# Patient Record
Sex: MALE | Race: WHITE | NOT HISPANIC OR LATINO | Employment: FULL TIME | URBAN - METROPOLITAN AREA
[De-identification: names, ages, dates, MRNs, and addresses within clinical notes are randomized per-mention and may not be internally consistent; named-entity substitution may affect disease eponyms.]

---

## 2019-12-12 ENCOUNTER — OFFICE VISIT (OUTPATIENT)
Dept: URGENT CARE | Facility: CLINIC | Age: 49
End: 2019-12-12
Payer: COMMERCIAL

## 2019-12-12 VITALS
HEIGHT: 73 IN | HEART RATE: 76 BPM | SYSTOLIC BLOOD PRESSURE: 164 MMHG | OXYGEN SATURATION: 100 % | DIASTOLIC BLOOD PRESSURE: 101 MMHG | TEMPERATURE: 99 F | BODY MASS INDEX: 27.83 KG/M2 | WEIGHT: 210 LBS

## 2019-12-12 DIAGNOSIS — L03.90 CELLULITIS, UNSPECIFIED CELLULITIS SITE: ICD-10-CM
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
## 2019-12-12 PROCEDURE — 90471 TD VACCINE GREATER THAN OR EQUAL TO 7YO WITH PRESERVATIVE IM: ICD-10-PCS | Mod: S$GLB,,, | Performed by: FAMILY MEDICINE

## 2019-12-12 PROCEDURE — 99203 OFFICE O/P NEW LOW 30 MIN: CPT | Mod: 25,S$GLB,, | Performed by: NURSE PRACTITIONER

## 2019-12-12 PROCEDURE — 99203 PR OFFICE/OUTPT VISIT, NEW, LEVL III, 30-44 MIN: ICD-10-PCS | Mod: 25,S$GLB,, | Performed by: NURSE PRACTITIONER

## 2019-12-12 PROCEDURE — 90471 IMMUNIZATION ADMIN: CPT | Mod: S$GLB,,, | Performed by: FAMILY MEDICINE

## 2019-12-12 PROCEDURE — 90714 TD VACC NO PRESV 7 YRS+ IM: CPT | Mod: S$GLB,,, | Performed by: FAMILY MEDICINE

## 2019-12-12 PROCEDURE — 90714 TD VACCINE GREATER THAN OR EQUAL TO 7YO WITH PRESERVATIVE IM: ICD-10-PCS | Mod: S$GLB,,, | Performed by: FAMILY MEDICINE

## 2019-12-12 RX ORDER — CLINDAMYCIN PHOSPHATE 150 MG/ML
600 INJECTION, SOLUTION INTRAVENOUS
Status: COMPLETED | OUTPATIENT
Start: 2019-12-12 | End: 2019-12-12

## 2019-12-12 RX ORDER — CLINDAMYCIN PHOSPHATE 150 MG/ML
600 INJECTION, SOLUTION INTRAVENOUS
Status: DISCONTINUED | OUTPATIENT
Start: 2019-12-12 | End: 2019-12-12

## 2019-12-12 RX ORDER — NAPROXEN SODIUM 220 MG/1
TABLET, FILM COATED ORAL
COMMUNITY
End: 2022-12-19

## 2019-12-12 RX ORDER — AMOXICILLIN AND CLAVULANATE POTASSIUM 875; 125 MG/1; MG/1
1 TABLET, FILM COATED ORAL 2 TIMES DAILY
Qty: 20 TABLET | Refills: 0 | Status: SHIPPED | OUTPATIENT
Start: 2019-12-12 | End: 2019-12-22

## 2019-12-12 RX ADMIN — CLINDAMYCIN PHOSPHATE 600 MG: 150 INJECTION, SOLUTION INTRAVENOUS at 10:12

## 2019-12-12 NOTE — PROGRESS NOTES
"Subjective:       Patient ID: Dimitri Lott is a 49 y.o. male.    Vitals:  height is 6' 1" (1.854 m) and weight is 95.3 kg (210 lb). His oral temperature is 98.5 °F (36.9 °C). His blood pressure is 164/101 (abnormal) and his pulse is 76. His oxygen saturation is 100%.     Chief Complaint: Animal Bite (right hand)    Mid-day yesterday his cat bit him on the right hand.  Cleaned it with warm soap/water and alcohol.  Treated with ice overnight.    Cat is up to date on immunizations.   He states his tetanus is possible utd but he is unsure     Animal Bite    The incident occurred yesterday. The incident occurred at home. There is an injury to the right hand. The pain is moderate. It is unlikely that a foreign body is present. Pertinent negatives include no cough. His tetanus status is unknown (pt stated he thinks it was a few years ago at [facility]).       Constitution: Negative for chills and fever.   HENT: Negative for facial swelling and sore throat.    Neck: Negative for painful lymph nodes.   Eyes: Negative for eye itching and eyelid swelling.   Respiratory: Negative for cough.    Musculoskeletal: Negative for joint pain and joint swelling.   Skin: Positive for color change, wound, puncture wound and erythema. Negative for pale, rash, abrasion, laceration, lesion, skin thickening/induration, abscess, avulsion and hives.   Allergic/Immunologic: Negative for environmental allergies, immunocompromised state and hives.   Hematologic/Lymphatic: Negative for swollen lymph nodes.       Objective:      Physical Exam   Constitutional: He is oriented to person, place, and time.   Pulmonary/Chest: Effort normal. No respiratory distress.   Musculoskeletal:        Right wrist: He exhibits decreased range of motion, tenderness and swelling. He exhibits no effusion, no crepitus and no deformity.        Arms:  Neurological: He is alert and oriented to person, place, and time.   Skin: Skin is warm. Lesions:  erythema      "       Assessment:       1. Cat bite, initial encounter    2. Cellulitis, unspecified cellulitis site        Plan:         Cat bite, initial encounter    Cellulitis, unspecified cellulitis site    Other orders  -     Cancel: (In Office Administered) Td Vaccine - Preservative Free  -     Discontinue: clindamycin injection 600 mg  -     amoxicillin-clavulanate 875-125mg (AUGMENTIN) 875-125 mg per tablet; Take 1 tablet by mouth 2 (two) times daily. for 10 days  Dispense: 20 tablet; Refill: 0  -     (In Office Administered) Td Vaccine  -     clindamycin injection 600 mg     update the tetanus shot      ER precautions if the redness or swelling starts to streak up the or increase worsening with fever been go to the ER  Patient Instructions    Please see her primary care provider concerning  your high blood pressure    Cat Bite    A cat bite can cause a wound deep enough to break the skin. In such cases, the wound is cleaned and then sometimes closed. If the wound is closed it is usually not closed completely. This is so that fluid can drain if the wound becomes infected. Often the wound is left open to heal. In addition to wound care, a tetanus shot may be given, if needed.  Home care  · Wash your hands well with soap and warm water before and after caring for the wound. This helps lower the risk of infection.  · Care for the wound as directed. If a dressing was applied to the wound, be sure to change it as directed.  · If the wound bleeds, place a clean, soft cloth on the wound. Then firmly apply pressure until the bleeding stops. This may take up to 5 minutes. Do not release the pressure and look at the wound during this time.  · Always get medical attention for cat bites on the hand. They are highly likely to become infected.  · Most wounds heal within 10 days. But an infection can occur even with proper treatment. So be sure to check the wound daily for signs of infection (see below).  · Antibiotics may be prescribed.  These help prevent or treat infection. If youre given antibiotics, take them as directed. Also be sure to complete the medicines.  Rabies prevention  Rabies is a virus that can be carried in certain animals. These can include domestic animals such as cats and dogs. Pets fully vaccinated against rabies (2 shots) are at very low risk of infection. But because human rabies is almost always fatal, any biting pet should be confined for 10 days as an extra precaution. In general, if there is a risk for rabies, the following steps may need to be taken:  · If someones pet cat has bitten you, it should be kept in a secure area for the next 10 days to watch for signs of illness. (If the pet owner wont allow this, contact your local animal control center.) If the cat becomes ill or dies during that time, contact your local animal control center at once so the animal may be tested for rabies. If the cat stays healthy for the next 10 days, there is no danger of rabies in the animal or you.  · If a stray cat bit you, contact your local animal control center. They can give information on capture, quarantine, and animal rabies testing.  · If you cant find the animal that bit you in the next 2 days, and if rabies exists in your area, you may need to receive the rabies vaccine series. Call your healthcare provider right away. Or return to the emergency department promptly.  · All animal bites should be reported to the local animal control center. If you were not given a form to fill out, you can report this yourself.  Follow-up care  Follow up with your healthcare provider, or as directed.  When to seek medical advice  Call your healthcare provider right away if any of these occur:  · Signs of infection:  ¨ Spreading redness or warmth from the wound  ¨ Increased pain or swelling  ¨ Fever of 100.4ºF (38ºC) or higher, or as directed by your healthcare provider  ¨ Colored fluid or pus draining from the wound  ¨ Enlarged lymph nodes  above the area that was bitten, such as lymph nodes in the armpit if you were bitten on the hand or arm. This may be a sign of cat-scratch disease (cat-scratch fever).  · Signs of rabies infection:  ¨ Headache  ¨ Confusion  ¨ Strange behavior  ¨ Increased salivating or drooling  ¨ Seizure  · Decreased ability to move any body part near the bite area  · Bleeding that can't be stopped after 5 minutes of firm pressure  Date Last Reviewed: 3/23/2015  © 0700-5490 [employer]. [address] [place], [place] [id number]. All rights reserved. This information is not intended as a substitute for professional medical care. Always follow your healthcare professional's instructions.

## 2019-12-12 NOTE — PATIENT INSTRUCTIONS
Please see her primary care provider concerning  your high blood pressure    Cat Bite    A cat bite can cause a wound deep enough to break the skin. In such cases, the wound is cleaned and then sometimes closed. If the wound is closed it is usually not closed completely. This is so that fluid can drain if the wound becomes infected. Often the wound is left open to heal. In addition to wound care, a tetanus shot may be given, if needed.  Home care  · Wash your hands well with soap and warm water before and after caring for the wound. This helps lower the risk of infection.  · Care for the wound as directed. If a dressing was applied to the wound, be sure to change it as directed.  · If the wound bleeds, place a clean, soft cloth on the wound. Then firmly apply pressure until the bleeding stops. This may take up to 5 minutes. Do not release the pressure and look at the wound during this time.  · Always get medical attention for cat bites on the hand. They are highly likely to become infected.  · Most wounds heal within 10 days. But an infection can occur even with proper treatment. So be sure to check the wound daily for signs of infection (see below).  · Antibiotics may be prescribed. These help prevent or treat infection. If youre given antibiotics, take them as directed. Also be sure to complete the medicines.  Rabies prevention  Rabies is a virus that can be carried in certain animals. These can include domestic animals such as cats and dogs. Pets fully vaccinated against rabies (2 shots) are at very low risk of infection. But because human rabies is almost always fatal, any biting pet should be confined for 10 days as an extra precaution. In general, if there is a risk for rabies, the following steps may need to be taken:  · If someones pet cat has bitten you, it should be kept in a secure area for the next 10 days to watch for signs of illness. (If the pet owner wont allow this, contact your local animal  control center.) If the cat becomes ill or dies during that time, contact your local animal control center at once so the animal may be tested for rabies. If the cat stays healthy for the next 10 days, there is no danger of rabies in the animal or you.  · If a stray cat bit you, contact your local animal control center. They can give information on capture, quarantine, and animal rabies testing.  · If you cant find the animal that bit you in the next 2 days, and if rabies exists in your area, you may need to receive the rabies vaccine series. Call your healthcare provider right away. Or return to the emergency department promptly.  · All animal bites should be reported to the local animal control center. If you were not given a form to fill out, you can report this yourself.  Follow-up care  Follow up with your healthcare provider, or as directed.  When to seek medical advice  Call your healthcare provider right away if any of these occur:  · Signs of infection:  ¨ Spreading redness or warmth from the wound  ¨ Increased pain or swelling  ¨ Fever of 100.4ºF (38ºC) or higher, or as directed by your healthcare provider  ¨ Colored fluid or pus draining from the wound  ¨ Enlarged lymph nodes above the area that was bitten, such as lymph nodes in the armpit if you were bitten on the hand or arm. This may be a sign of cat-scratch disease (cat-scratch fever).  · Signs of rabies infection:  ¨ Headache  ¨ Confusion  ¨ Strange behavior  ¨ Increased salivating or drooling  ¨ Seizure  · Decreased ability to move any body part near the bite area  · Bleeding that can't be stopped after 5 minutes of firm pressure  Date Last Reviewed: 3/23/2015  © 4277-4151 [employer]. [address] [place] [address], [place] [address]. All rights reserved. This information is not intended as a substitute for professional medical care. Always follow your healthcare professional's instructions.

## 2019-12-23 DIAGNOSIS — E78.5 HYPERLIPIDEMIA, UNSPECIFIED HYPERLIPIDEMIA TYPE: Primary | ICD-10-CM

## 2019-12-23 RX ORDER — ATORVASTATIN CALCIUM 40 MG/1
40 TABLET, FILM COATED ORAL DAILY
Qty: 90 TABLET | Refills: 1 | Status: SHIPPED | OUTPATIENT
Start: 2019-12-23 | End: 2020-01-13 | Stop reason: SDUPTHER

## 2019-12-23 NOTE — TELEPHONE ENCOUNTER
----- Message from Ifrah Romero sent at 12/23/2019 11:21 AM CST -----  Refill for Atorvastatin 40 mg. Qty 90. [facility] [address] in [place]. Pt #[phone]

## 2019-12-31 ENCOUNTER — TELEPHONE (OUTPATIENT)
Dept: FAMILY MEDICINE | Facility: CLINIC | Age: 49
End: 2019-12-31

## 2019-12-31 DIAGNOSIS — E78.00 PURE HYPERCHOLESTEROLEMIA: ICD-10-CM

## 2019-12-31 DIAGNOSIS — Z79.899 ENCOUNTER FOR LONG-TERM (CURRENT) USE OF OTHER MEDICATIONS: Primary | ICD-10-CM

## 2020-01-12 PROBLEM — E78.00 PURE HYPERCHOLESTEROLEMIA: Status: ACTIVE | Noted: 2019-01-10

## 2020-01-13 ENCOUNTER — OFFICE VISIT (OUTPATIENT)
Dept: FAMILY MEDICINE | Facility: CLINIC | Age: 50
End: 2020-01-13
Payer: COMMERCIAL

## 2020-01-13 VITALS
WEIGHT: 212 LBS | SYSTOLIC BLOOD PRESSURE: 132 MMHG | DIASTOLIC BLOOD PRESSURE: 86 MMHG | HEART RATE: 64 BPM | BODY MASS INDEX: 27.97 KG/M2

## 2020-01-13 DIAGNOSIS — S61.451S CAT BITE OF RIGHT HAND, SEQUELA: ICD-10-CM
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
## 2020-01-13 DIAGNOSIS — E78.5 HYPERLIPIDEMIA, UNSPECIFIED HYPERLIPIDEMIA TYPE: Primary | ICD-10-CM

## 2020-01-13 PROCEDURE — 99214 OFFICE O/P EST MOD 30 MIN: CPT | Mod: S$GLB,,, | Performed by: FAMILY MEDICINE

## 2020-01-13 PROCEDURE — 99214 PR OFFICE/OUTPT VISIT, EST, LEVL IV, 30-39 MIN: ICD-10-PCS | Mod: S$GLB,,, | Performed by: FAMILY MEDICINE

## 2020-01-13 PROCEDURE — 3008F BODY MASS INDEX DOCD: CPT | Mod: S$GLB,,, | Performed by: FAMILY MEDICINE

## 2020-01-13 PROCEDURE — 3008F PR BODY MASS INDEX (BMI) DOCUMENTED: ICD-10-PCS | Mod: S$GLB,,, | Performed by: FAMILY MEDICINE

## 2020-01-13 RX ORDER — HALCINONIDE 1 MG/G
CREAM TOPICAL
Refills: 0 | COMMUNITY
Start: 2019-11-19 | End: 2021-01-04

## 2020-01-13 RX ORDER — ATORVASTATIN CALCIUM 40 MG/1
40 TABLET, FILM COATED ORAL DAILY
Qty: 90 TABLET | Refills: 1 | Status: SHIPPED | OUTPATIENT
Start: 2020-01-13 | End: 2020-08-13 | Stop reason: SDUPTHER

## 2020-01-13 NOTE — PROGRESS NOTES
SUBJECTIVE:    Patient ID: Dimitri Lott is a 49 y.o. male.    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  His wife goes with him they tried exercise during the week he walks 1-2 miles twice a week.  He does have a history of a recent PET CAT bite on his right hand 1 month ago which is still somewhat sore.  He does not smoke and he rarely drinks any alcohol.    2019 he had a colonoscopy and is to return in 5-10 years.      No visits with results within 6 Month(s) from this visit.   Latest known visit with results is:   No results found for any previous visit.       Past Medical History:   Diagnosis Date    Hyperlipidemia      Past Surgical History:   Procedure Laterality Date    COLONOSCOPY  2019    Dr. -RTC 5-10 years     No family history on file.    Marital Status: Single  Alcohol History:  has no alcohol history on file.  Tobacco History:  reports that he has never smoked. He has never used smokeless tobacco.  Drug History:  has no drug history on file.    Review of patient's allergies indicates:  No Known Allergies    Current Outpatient Medications:     aspirin 81 MG Chew, 1 tablet, Disp: , Rfl:     atorvastatin (LIPITOR) 40 MG tablet, Take 1 tablet (40 mg total) by mouth once daily., Disp: 90 tablet, Rfl: 1    halcinonide (HALOG) 0.1 % Crea, APPLY TO AFECTED AREA OF ARMS TWICE DAILY FOR UP TO 2 WEEKS, Disp: , Rfl: 0    Review of Systems   Constitutional: Negative for appetite change, chills, fatigue, fever and unexpected weight change.   HENT: Negative for congestion (Flonase  prn/Zyrtec), ear pain and trouble swallowing.    Eyes: Negative for pain, discharge and visual disturbance.   Respiratory: Negative for apnea, cough, shortness of breath and wheezing.    Cardiovascular: Negative for chest pain and leg swelling.   Gastrointestinal:  Negative for abdominal pain, blood in stool, constipation, diarrhea, nausea and vomiting.   Endocrine: Negative for cold intolerance, heat intolerance and polydipsia.   Genitourinary: Negative for dysuria, hematuria, testicular pain and urgency.   Musculoskeletal: Negative for gait problem, joint swelling and myalgias.   Neurological: Negative for dizziness, seizures and numbness.   Psychiatric/Behavioral: Negative for agitation, behavioral problems, hallucinations and sleep disturbance. The patient is not nervous/anxious.           Objective:      Vitals:    01/13/20 0831   BP: 132/86   Pulse: 64   Weight: 96.2 kg (212 lb)     Body mass index is 27.97 kg/m².  Physical Exam   Constitutional: He is oriented to person, place, and time. He appears well-developed and well-nourished.   HENT:   Head: Normocephalic and atraumatic.   Right Ear: External ear normal.   Left Ear: External ear normal.   Nose: Nose normal.   Mouth/Throat: Oropharynx is clear and moist.   Eyes: Pupils are equal, round, and reactive to light. EOM are normal.   Neck: Normal range of motion. Neck supple. Carotid bruit is not present. No thyromegaly present.   Cardiovascular: Normal rate, regular rhythm, normal heart sounds and intact distal pulses.   No murmur heard.  Pulmonary/Chest: Effort normal and breath sounds normal. He has no wheezes. He has no rales.   Abdominal: Soft. Bowel sounds are normal. He exhibits no distension. There is no hepatosplenomegaly. There is no tenderness.   Musculoskeletal: Normal range of motion. He exhibits no tenderness or deformity.        Lumbar back: Normal. He exhibits no pain and no spasm.   Bends 90 degrees at  waist, shoulders and knees have full range of motion, he walks with no limp.   Lymphadenopathy:     He has no cervical adenopathy.   Neurological: He is alert and oriented to person, place, and time. No cranial nerve deficit. Coordination normal.   Skin: Skin is warm and dry. No rash noted.   Puncture  scars to the right index finger MCP joint area from a cat bite recently.  No erythema or swelling noted   Psychiatric: He has a normal mood and affect. His behavior is normal. Judgment and thought content normal.   Nursing note and vitals reviewed.        Assessment:       1. Hyperlipidemia, unspecified hyperlipidemia type    2. Cat bite of right hand, sequela         Plan:       Hyperlipidemia, unspecified hyperlipidemia type  -     atorvastatin (LIPITOR) 40 MG tablet; Take 1 tablet (40 mg total) by mouth once daily.  Dispense: 90 tablet; Refill: 1  -     Comprehensive metabolic panel; Future; Expected date: 01/13/2020  -     Lipid panel; Future; Expected date: 01/13/2020  Will draw lipids today.  Continue atorvastatin, recheck lipids in 6 months as well  Cat bite of right hand, sequela  No obvious infection seen in the right hand but if pain worsens we will investigate with further imaging x-rays    No follow-ups on file.

## 2020-01-14 LAB
ALBUMIN SERPL-MCNC: 4.8 G/DL (ref 3.6–5.1)
ALBUMIN/GLOB SERPL: 1.9 (CALC) (ref 1–2.5)
ALP SERPL-CCNC: 88 U/L (ref 40–115)
ALT SERPL-CCNC: 47 U/L (ref 9–46)
AST SERPL-CCNC: 24 U/L (ref 10–35)
BILIRUB SERPL-MCNC: 0.6 MG/DL (ref 0.2–1.2)
BUN SERPL-MCNC: 13 MG/DL (ref 7–25)
BUN/CREAT SERPL: ABNORMAL (CALC) (ref 6–22)
CALCIUM SERPL-MCNC: 9.7 MG/DL (ref 8.6–10.3)
CHLORIDE SERPL-SCNC: 104 MMOL/L (ref 98–110)
CHOLEST SERPL-MCNC: 141 MG/DL
CHOLEST/HDLC SERPL: 3.9 (CALC)
CO2 SERPL-SCNC: 28 MMOL/L (ref 20–32)
CREAT SERPL-MCNC: 1.13 MG/DL (ref 0.7–1.33)
GFRSERPLBLD MDRD-ARVRAT: 75 ML/MIN/1.73M2
GLOBULIN SER CALC-MCNC: 2.5 G/DL (CALC) (ref 1.9–3.7)
GLUCOSE SERPL-MCNC: 90 MG/DL (ref 65–99)
HDLC SERPL-MCNC: 36 MG/DL
LDLC SERPL CALC-MCNC: 71 MG/DL (CALC)
NONHDLC SERPL-MCNC: 105 MG/DL (CALC)
POTASSIUM SERPL-SCNC: 4.6 MMOL/L (ref 3.5–5.3)
PROT SERPL-MCNC: 7.3 G/DL (ref 6.1–8.1)
SODIUM SERPL-SCNC: 141 MMOL/L (ref 135–146)
TRIGL SERPL-MCNC: 243 MG/DL

## 2020-01-20 ENCOUNTER — TELEPHONE (OUTPATIENT)
Dept: FAMILY MEDICINE | Facility: CLINIC | Age: 50
End: 2020-01-20

## 2020-01-20 NOTE — TELEPHONE ENCOUNTER
Spoke to patient with results verbatim per Dr Sanchez. Verbalized understanding. Remind me for lab.

## 2020-01-20 NOTE — TELEPHONE ENCOUNTER
----- Message from Dionicio Sanchez MD sent at 1/18/2020  8:44 AM CST -----  Call patient.  Cholesterol low at 141.  Triglycerides still slightly high at 243.  Continue atorvastatin 40 mg daily.  Liver and kidneys look fine.  Recheck cbc  psa CMP and lipids in 6 months.

## 2020-07-07 ENCOUNTER — TELEPHONE (OUTPATIENT)
Dept: FAMILY MEDICINE | Facility: CLINIC | Age: 50
End: 2020-07-07

## 2020-07-07 DIAGNOSIS — Z79.899 ENCOUNTER FOR LONG-TERM (CURRENT) USE OF OTHER MEDICATIONS: ICD-10-CM

## 2020-07-07 DIAGNOSIS — E78.5 HYPERLIPIDEMIA, UNSPECIFIED HYPERLIPIDEMIA TYPE: Primary | ICD-10-CM

## 2020-07-07 DIAGNOSIS — Z12.5 SCREENING FOR PROSTATE CANCER: ICD-10-CM

## 2020-07-07 NOTE — TELEPHONE ENCOUNTER
LMOR to call Sonia CHURCH so that I can let him know fasting lab is due. Also sent portal message. Updated remind me. Orders pended to [facility].

## 2020-07-07 NOTE — TELEPHONE ENCOUNTER
----- Message from [facility], RT sent at 1/20/2020 11:52 AM CST -----  Regarding: Lab due  Notes recorded by Dionicio Sanchez MD on 1/18/2020 at 8:44 AM CST  Call patient.  Cholesterol low at 141.  Triglycerides still slightly high at 243.  Continue atorvastatin 40 mg daily.  Liver and kidneys look fine.  Recheck cbc  psa CMP and lipids in 6 months.

## 2020-07-23 ENCOUNTER — TELEPHONE (OUTPATIENT)
Dept: FAMILY MEDICINE | Facility: CLINIC | Age: 50
End: 2020-07-23

## 2020-07-23 NOTE — TELEPHONE ENCOUNTER
----- Message from [facility], RT sent at 1/20/2020 11:52 AM CST -----  Regarding: Lab due  Notes recorded by Dionicio Sanchez MD on 1/18/2020 at 8:44 AM CST  Call patient.  Cholesterol low at 141.  Triglycerides still slightly high at 243.  Continue atorvastatin 40 mg daily.  Liver and kidneys look fine.  Recheck cbc  psa CMP and lipids in 6 months.

## 2020-07-23 NOTE — TELEPHONE ENCOUNTER
LMOR that I had sent portal message regarding the reason I was calling and to call with any questions. No comm consent.

## 2020-08-06 ENCOUNTER — TELEPHONE (OUTPATIENT)
Dept: FAMILY MEDICINE | Facility: CLINIC | Age: 50
End: 2020-08-06

## 2020-08-06 NOTE — TELEPHONE ENCOUNTER
Sent another portal message today regarding this. Patient replied on 7/23 that he knows lab is due. This is 3rd/4th attempt. Can I aldair reminder complete?

## 2020-08-06 NOTE — TELEPHONE ENCOUNTER
----- Message from [facility], RT sent at 1/20/2020 11:52 AM CST -----  Regarding: Lab due  Notes recorded by Dionicio Sanchez MD on 1/18/2020 at 8:44 AM CST  Call patient.  Cholesterol low at 141.  Triglycerides still slightly high at 243.  Continue atorvastatin 40 mg daily.  Liver and kidneys look fine.  Recheck cbc  psa CMP and lipids in 6 months.

## 2020-08-12 LAB
ALBUMIN SERPL-MCNC: 4.9 G/DL (ref 3.6–5.1)
ALBUMIN/GLOB SERPL: 1.9 (CALC) (ref 1–2.5)
ALP SERPL-CCNC: 81 U/L (ref 35–144)
ALT SERPL-CCNC: 45 U/L (ref 9–46)
AST SERPL-CCNC: 28 U/L (ref 10–35)
BASOPHILS # BLD AUTO: 73 CELLS/UL (ref 0–200)
BASOPHILS NFR BLD AUTO: 1.1 %
BILIRUB SERPL-MCNC: 0.7 MG/DL (ref 0.2–1.2)
BUN SERPL-MCNC: 14 MG/DL (ref 7–25)
BUN/CREAT SERPL: NORMAL (CALC) (ref 6–22)
CALCIUM SERPL-MCNC: 9.6 MG/DL (ref 8.6–10.3)
CHLORIDE SERPL-SCNC: 103 MMOL/L (ref 98–110)
CHOLEST SERPL-MCNC: 180 MG/DL
CHOLEST/HDLC SERPL: 5 (CALC)
CO2 SERPL-SCNC: 29 MMOL/L (ref 20–32)
CREAT SERPL-MCNC: 1.11 MG/DL (ref 0.7–1.33)
EOSINOPHIL # BLD AUTO: 462 CELLS/UL (ref 15–500)
EOSINOPHIL NFR BLD AUTO: 7 %
ERYTHROCYTE [DISTWIDTH] IN BLOOD BY AUTOMATED COUNT: 12.7 % (ref 11–15)
GFRSERPLBLD MDRD-ARVRAT: 76 ML/MIN/1.73M2
GLOBULIN SER CALC-MCNC: 2.6 G/DL (CALC) (ref 1.9–3.7)
GLUCOSE SERPL-MCNC: 96 MG/DL (ref 65–99)
HCT VFR BLD AUTO: 45.5 % (ref 38.5–50)
HDLC SERPL-MCNC: 36 MG/DL
HGB BLD-MCNC: 15 G/DL (ref 13.2–17.1)
LDLC SERPL CALC-MCNC: 96 MG/DL (CALC)
LYMPHOCYTES # BLD AUTO: 2198 CELLS/UL (ref 850–3900)
LYMPHOCYTES NFR BLD AUTO: 33.3 %
MCH RBC QN AUTO: 29.6 PG (ref 27–33)
MCHC RBC AUTO-ENTMCNC: 33 G/DL (ref 32–36)
MCV RBC AUTO: 89.9 FL (ref 80–100)
MONOCYTES # BLD AUTO: 521 CELLS/UL (ref 200–950)
MONOCYTES NFR BLD AUTO: 7.9 %
NEUTROPHILS # BLD AUTO: 3346 CELLS/UL (ref 1500–7800)
NEUTROPHILS NFR BLD AUTO: 50.7 %
NONHDLC SERPL-MCNC: 144 MG/DL (CALC)
PLATELET # BLD AUTO: 285 THOUSAND/UL (ref 140–400)
PMV BLD REES-ECKER: 9.7 FL (ref 7.5–12.5)
POTASSIUM SERPL-SCNC: 4.5 MMOL/L (ref 3.5–5.3)
PROT SERPL-MCNC: 7.5 G/DL (ref 6.1–8.1)
PSA SERPL-MCNC: 0.3 NG/ML
RBC # BLD AUTO: 5.06 MILLION/UL (ref 4.2–5.8)
SODIUM SERPL-SCNC: 141 MMOL/L (ref 135–146)
TRIGL SERPL-MCNC: 358 MG/DL
WBC # BLD AUTO: 6.6 THOUSAND/UL (ref 3.8–10.8)

## 2020-08-13 ENCOUNTER — PATIENT MESSAGE (OUTPATIENT)
Dept: FAMILY MEDICINE | Facility: CLINIC | Age: 50
End: 2020-08-13

## 2020-08-13 ENCOUNTER — OFFICE VISIT (OUTPATIENT)
Dept: FAMILY MEDICINE | Facility: CLINIC | Age: 50
End: 2020-08-13
Payer: COMMERCIAL

## 2020-08-13 DIAGNOSIS — E78.2 MIXED HYPERLIPIDEMIA: ICD-10-CM

## 2020-08-13 PROCEDURE — 99212 PR OFFICE/OUTPT VISIT, EST, LEVL II, 10-19 MIN: ICD-10-PCS | Mod: 95,,, | Performed by: NURSE PRACTITIONER

## 2020-08-13 PROCEDURE — 99212 OFFICE O/P EST SF 10 MIN: CPT | Mod: 95,,, | Performed by: NURSE PRACTITIONER

## 2020-08-13 RX ORDER — ATORVASTATIN CALCIUM 40 MG/1
40 TABLET, FILM COATED ORAL DAILY
Qty: 90 TABLET | Refills: 1 | Status: SHIPPED | OUTPATIENT
Start: 2020-08-13 | End: 2021-01-04 | Stop reason: SDUPTHER

## 2020-08-13 NOTE — PROGRESS NOTES
Subjective:        The chief complaint leading to consultation is: lab results and med refill  The patient location is:  Home  Visit type: Virtual visit with synchronous audio/video or audio only  This was a phone conversation in lieu of in-person visit due to the coronavirus emergency. Patient acknowledged and agreed to the telephone encounter.     Pt presents via audio visit for med refill and discuss lab results. Triglycerides a little elevated from last set of blood work but overall cholesterol good. Per pt, has history of very high triglycerides and these numbers are much better under control. All other blood work unremarkable. Denies any recent illnesses or complaints.       Past Surgical History:   Procedure Laterality Date    COLONOSCOPY  2019    Dr. -RTC 5-10 years     Past Medical History:   Diagnosis Date    Hyperlipidemia      No family history on file.     Social History:   Marital Status: Single  Alcohol History:  has no history on file for alcohol.  Tobacco History:  reports that he has never smoked. He has never used smokeless tobacco.  Drug History:  has no history on file for drug.    Review of patient's allergies indicates:  No Known Allergies    Current Outpatient Medications   Medication Sig Dispense Refill    aspirin 81 MG Chew 1 tablet      atorvastatin (LIPITOR) 40 MG tablet Take 1 tablet (40 mg total) by mouth once daily. 90 tablet 1    halcinonide (HALOG) 0.1 % Crea APPLY TO AFECTED AREA OF ARMS TWICE DAILY FOR UP TO 2 WEEKS  0     No current facility-administered medications for this visit.        Review of Systems   Constitutional: Negative.    HENT: Negative for congestion, ear pain, sinus pressure, sinus pain, tinnitus and trouble swallowing.    Eyes: Negative for pain and redness.   Respiratory: Negative for cough, chest tightness, shortness of breath and wheezing.    Cardiovascular: Negative for chest pain and palpitations.   Gastrointestinal: Negative for abdominal  pain, nausea and vomiting.   Genitourinary: Negative for dysuria, frequency and urgency.   Musculoskeletal: Negative for arthralgias, back pain and myalgias.   Skin: Negative for rash and wound.   Neurological: Negative for dizziness, weakness, light-headedness and headaches.   Psychiatric/Behavioral: Negative.          Objective:        Physical Exam:   Physical Exam  Pulmonary:      Effort: No respiratory distress.   Neurological:      Mental Status: He is alert and oriented to person, place, and time.   Psychiatric:         Mood and Affect: Mood normal.              Assessment:       1. Mixed hyperlipidemia      Plan:   Mixed hyperlipidemia  -     atorvastatin (LIPITOR) 40 MG tablet; Take 1 tablet (40 mg total) by mouth once daily.  Dispense: 90 tablet; Refill: 1  - Consider adding triglyceride-lowering agent but pt not interested at this time.       Follow up in about 6 months (around 2/13/2021) for Annual Physical, Follow-up with Dr. Sanchez.    Total time spent with patient: 10 minutes    Each patient to whom he or she provides medical services by telemedicine is:  (1) informed of the relationship between the physician and patient and the respective role of any other health care provider with respect to management of the patient; and (2) notified that he or she may decline to receive medical services by telemedicine and may withdraw from such care at any time.    This note was created using [employer] voice recognition software that occasionally misinterprets phrases or words.

## 2020-08-17 ENCOUNTER — TELEPHONE (OUTPATIENT)
Dept: FAMILY MEDICINE | Facility: CLINIC | Age: 50
End: 2020-08-17

## 2020-08-17 NOTE — TELEPHONE ENCOUNTER
----- Message from Dionicio Sanchez MD sent at 8/16/2020  2:04 PM CDT -----  Call patient.  CBC looks normal with no anemia.  Cholesterol normal at 180 but triglycerides are high at 358.  Kidneys and liver are normal.  Prostate normal with a PSA of 0.3.  To reduce triglycerides add Krill oil twice a day.  Continue atorvastatin.  Repeat CMP and lipids in 6 months

## 2020-08-18 RX ORDER — MV/FA/DHA/EPA/FISH OIL/SAW/GNK 400MCG-200
1 COMBINATION PACKAGE (EA) ORAL 2 TIMES DAILY
COMMUNITY

## 2020-08-18 NOTE — TELEPHONE ENCOUNTER
Spoke to patient with results verbatim per Dr Sanchez. Verbalized understanding on all. Will start Krill Oil. Added to med list.  Remind me created for lab.

## 2021-01-04 ENCOUNTER — OFFICE VISIT (OUTPATIENT)
Dept: FAMILY MEDICINE | Facility: CLINIC | Age: 51
End: 2021-01-04
Payer: COMMERCIAL

## 2021-01-04 VITALS
SYSTOLIC BLOOD PRESSURE: 138 MMHG | DIASTOLIC BLOOD PRESSURE: 88 MMHG | HEART RATE: 72 BPM | BODY MASS INDEX: 27.7 KG/M2 | WEIGHT: 209 LBS | HEIGHT: 73 IN

## 2021-01-04 DIAGNOSIS — Z00.00 GENERAL MEDICAL EXAM: ICD-10-CM

## 2021-01-04 DIAGNOSIS — E78.2 MIXED HYPERLIPIDEMIA: ICD-10-CM

## 2021-01-04 PROCEDURE — 3008F BODY MASS INDEX DOCD: CPT | Mod: S$GLB,,, | Performed by: NURSE PRACTITIONER

## 2021-01-04 PROCEDURE — 99396 PR PREVENTIVE VISIT,EST,40-64: ICD-10-PCS | Mod: S$GLB,,, | Performed by: NURSE PRACTITIONER

## 2021-01-04 PROCEDURE — 3008F PR BODY MASS INDEX (BMI) DOCUMENTED: ICD-10-PCS | Mod: S$GLB,,, | Performed by: NURSE PRACTITIONER

## 2021-01-04 PROCEDURE — 99396 PREV VISIT EST AGE 40-64: CPT | Mod: S$GLB,,, | Performed by: NURSE PRACTITIONER

## 2021-01-04 RX ORDER — ATORVASTATIN CALCIUM 40 MG/1
40 TABLET, FILM COATED ORAL DAILY
Qty: 90 TABLET | Refills: 1 | Status: SHIPPED | OUTPATIENT
Start: 2021-01-04 | End: 2021-10-18 | Stop reason: SDUPTHER

## 2021-01-05 LAB
ALBUMIN SERPL-MCNC: 4.8 G/DL (ref 3.6–5.1)
ALBUMIN/GLOB SERPL: 1.8 (CALC) (ref 1–2.5)
ALP SERPL-CCNC: 79 U/L (ref 35–144)
ALT SERPL-CCNC: 71 U/L (ref 9–46)
AST SERPL-CCNC: 42 U/L (ref 10–35)
BILIRUB SERPL-MCNC: 0.8 MG/DL (ref 0.2–1.2)
BUN SERPL-MCNC: 12 MG/DL (ref 7–25)
BUN/CREAT SERPL: ABNORMAL (CALC) (ref 6–22)
CALCIUM SERPL-MCNC: 9.9 MG/DL (ref 8.6–10.3)
CHLORIDE SERPL-SCNC: 104 MMOL/L (ref 98–110)
CHOLEST SERPL-MCNC: 166 MG/DL
CHOLEST/HDLC SERPL: 4 (CALC)
CO2 SERPL-SCNC: 23 MMOL/L (ref 20–32)
CREAT SERPL-MCNC: 0.98 MG/DL (ref 0.7–1.33)
GFRSERPLBLD MDRD-ARVRAT: 88 ML/MIN/1.73M2
GLOBULIN SER CALC-MCNC: 2.6 G/DL (CALC) (ref 1.9–3.7)
GLUCOSE SERPL-MCNC: 88 MG/DL (ref 65–99)
HDLC SERPL-MCNC: 41 MG/DL
LDLC SERPL CALC-MCNC: 92 MG/DL (CALC)
NONHDLC SERPL-MCNC: 125 MG/DL (CALC)
POTASSIUM SERPL-SCNC: 4.7 MMOL/L (ref 3.5–5.3)
PROT SERPL-MCNC: 7.4 G/DL (ref 6.1–8.1)
SODIUM SERPL-SCNC: 137 MMOL/L (ref 135–146)
TRIGL SERPL-MCNC: 235 MG/DL

## 2021-04-29 ENCOUNTER — PATIENT MESSAGE (OUTPATIENT)
Dept: RESEARCH | Facility: HOSPITAL | Age: 51
End: 2021-04-29

## 2021-08-09 ENCOUNTER — PATIENT MESSAGE (OUTPATIENT)
Dept: FAMILY MEDICINE | Facility: CLINIC | Age: 51
End: 2021-08-09

## 2021-10-17 ENCOUNTER — PATIENT MESSAGE (OUTPATIENT)
Dept: FAMILY MEDICINE | Facility: CLINIC | Age: 51
End: 2021-10-17
Payer: COMMERCIAL

## 2021-10-17 DIAGNOSIS — E78.2 MIXED HYPERLIPIDEMIA: ICD-10-CM

## 2021-10-18 RX ORDER — ATORVASTATIN CALCIUM 40 MG/1
40 TABLET, FILM COATED ORAL DAILY
Qty: 90 TABLET | Refills: 1 | Status: SHIPPED | OUTPATIENT
Start: 2021-10-18 | End: 2021-12-06 | Stop reason: SDUPTHER

## 2021-11-29 ENCOUNTER — TELEPHONE (OUTPATIENT)
Dept: FAMILY MEDICINE | Facility: CLINIC | Age: 51
End: 2021-11-29
Payer: COMMERCIAL

## 2021-11-29 DIAGNOSIS — Z12.5 SPECIAL SCREENING FOR MALIGNANT NEOPLASM OF PROSTATE: ICD-10-CM

## 2021-11-29 DIAGNOSIS — Z79.899 ENCOUNTER FOR LONG-TERM (CURRENT) USE OF OTHER MEDICATIONS: Primary | ICD-10-CM

## 2021-11-29 DIAGNOSIS — Z00.00 ROUTINE GENERAL MEDICAL EXAMINATION AT A HEALTH CARE FACILITY: ICD-10-CM

## 2021-12-03 LAB
ALBUMIN SERPL-MCNC: 4.8 G/DL (ref 3.6–5.1)
ALBUMIN/CREAT UR: 6 MCG/MG CREAT
ALBUMIN/GLOB SERPL: 1.8 (CALC) (ref 1–2.5)
ALP SERPL-CCNC: 82 U/L (ref 35–144)
ALT SERPL-CCNC: 35 U/L (ref 9–46)
APPEARANCE UR: CLEAR
AST SERPL-CCNC: 22 U/L (ref 10–35)
BACTERIA #/AREA URNS HPF: NORMAL /HPF
BACTERIA UR CULT: NORMAL
BASOPHILS # BLD AUTO: 62 CELLS/UL (ref 0–200)
BASOPHILS NFR BLD AUTO: 1.1 %
BILIRUB SERPL-MCNC: 0.9 MG/DL (ref 0.2–1.2)
BILIRUB UR QL STRIP: NEGATIVE
BUN SERPL-MCNC: 14 MG/DL (ref 7–25)
BUN/CREAT SERPL: NORMAL (CALC) (ref 6–22)
CALCIUM SERPL-MCNC: 9.6 MG/DL (ref 8.6–10.3)
CHLORIDE SERPL-SCNC: 104 MMOL/L (ref 98–110)
CHOLEST SERPL-MCNC: 168 MG/DL
CHOLEST/HDLC SERPL: 4.4 (CALC)
CO2 SERPL-SCNC: 24 MMOL/L (ref 20–32)
COLOR UR: YELLOW
CREAT SERPL-MCNC: 0.93 MG/DL (ref 0.7–1.33)
CREAT UR-MCNC: 175 MG/DL (ref 20–320)
EOSINOPHIL # BLD AUTO: 459 CELLS/UL (ref 15–500)
EOSINOPHIL NFR BLD AUTO: 8.2 %
ERYTHROCYTE [DISTWIDTH] IN BLOOD BY AUTOMATED COUNT: 12.3 % (ref 11–15)
GLOBULIN SER CALC-MCNC: 2.6 G/DL (CALC) (ref 1.9–3.7)
GLUCOSE SERPL-MCNC: 86 MG/DL (ref 65–99)
GLUCOSE UR QL STRIP: NEGATIVE
HCT VFR BLD AUTO: 47 % (ref 38.5–50)
HDLC SERPL-MCNC: 38 MG/DL
HGB BLD-MCNC: 15.9 G/DL (ref 13.2–17.1)
HGB UR QL STRIP: NEGATIVE
HYALINE CASTS #/AREA URNS LPF: NORMAL /LPF
KETONES UR QL STRIP: NEGATIVE
LDLC SERPL CALC-MCNC: 87 MG/DL (CALC)
LEUKOCYTE ESTERASE UR QL STRIP: NEGATIVE
LYMPHOCYTES # BLD AUTO: 1725 CELLS/UL (ref 850–3900)
LYMPHOCYTES NFR BLD AUTO: 30.8 %
MCH RBC QN AUTO: 30.5 PG (ref 27–33)
MCHC RBC AUTO-ENTMCNC: 33.8 G/DL (ref 32–36)
MCV RBC AUTO: 90.2 FL (ref 80–100)
MICROALBUMIN UR-MCNC: 1.1 MG/DL
MONOCYTES # BLD AUTO: 442 CELLS/UL (ref 200–950)
MONOCYTES NFR BLD AUTO: 7.9 %
NEUTROPHILS # BLD AUTO: 2912 CELLS/UL (ref 1500–7800)
NEUTROPHILS NFR BLD AUTO: 52 %
NITRITE UR QL STRIP: NEGATIVE
NONHDLC SERPL-MCNC: 130 MG/DL (CALC)
PH UR STRIP: 5.5 [PH] (ref 5–8)
PLATELET # BLD AUTO: 295 THOUSAND/UL (ref 140–400)
PMV BLD REES-ECKER: 9.6 FL (ref 7.5–12.5)
POTASSIUM SERPL-SCNC: 4.4 MMOL/L (ref 3.5–5.3)
PROT SERPL-MCNC: 7.4 G/DL (ref 6.1–8.1)
PROT UR QL STRIP: NEGATIVE
PSA SERPL-MCNC: 0.34 NG/ML
RBC # BLD AUTO: 5.21 MILLION/UL (ref 4.2–5.8)
RBC #/AREA URNS HPF: NORMAL /HPF
SODIUM SERPL-SCNC: 138 MMOL/L (ref 135–146)
SP GR UR STRIP: 1.02 (ref 1–1.03)
SQUAMOUS #/AREA URNS HPF: NORMAL /HPF
TRIGL SERPL-MCNC: 323 MG/DL
TSH SERPL-ACNC: 2.22 MIU/L (ref 0.4–4.5)
WBC # BLD AUTO: 5.6 THOUSAND/UL (ref 3.8–10.8)
WBC #/AREA URNS HPF: NORMAL /HPF

## 2021-12-06 ENCOUNTER — OFFICE VISIT (OUTPATIENT)
Dept: FAMILY MEDICINE | Facility: CLINIC | Age: 51
End: 2021-12-06
Payer: COMMERCIAL

## 2021-12-06 VITALS
WEIGHT: 209 LBS | BODY MASS INDEX: 27.7 KG/M2 | SYSTOLIC BLOOD PRESSURE: 128 MMHG | HEIGHT: 73 IN | HEART RATE: 76 BPM | OXYGEN SATURATION: 98 % | DIASTOLIC BLOOD PRESSURE: 78 MMHG

## 2021-12-06 DIAGNOSIS — E78.1 HYPERTRIGLYCERIDEMIA: ICD-10-CM

## 2021-12-06 DIAGNOSIS — Z00.00 ANNUAL VISIT FOR GENERAL ADULT MEDICAL EXAMINATION WITHOUT ABNORMAL FINDINGS: Primary | ICD-10-CM

## 2021-12-06 DIAGNOSIS — E78.2 MIXED HYPERLIPIDEMIA: ICD-10-CM

## 2021-12-06 PROCEDURE — 99396 PREV VISIT EST AGE 40-64: CPT | Mod: S$GLB,,, | Performed by: PHYSICIAN ASSISTANT

## 2021-12-06 PROCEDURE — 99396 PR PREVENTIVE VISIT,EST,40-64: ICD-10-PCS | Mod: S$GLB,,, | Performed by: PHYSICIAN ASSISTANT

## 2021-12-06 RX ORDER — ATORVASTATIN CALCIUM 40 MG/1
40 TABLET, FILM COATED ORAL DAILY
Qty: 90 TABLET | Refills: 3 | Status: SHIPPED | OUTPATIENT
Start: 2021-12-06 | End: 2023-03-06 | Stop reason: SDUPTHER

## 2021-12-09 ENCOUNTER — TELEPHONE (OUTPATIENT)
Dept: FAMILY MEDICINE | Facility: CLINIC | Age: 51
End: 2021-12-09
Payer: COMMERCIAL

## 2021-12-30 DIAGNOSIS — E78.2 MIXED HYPERLIPIDEMIA: ICD-10-CM

## 2021-12-30 RX ORDER — ATORVASTATIN CALCIUM 40 MG/1
40 TABLET, FILM COATED ORAL DAILY
Qty: 90 TABLET | Refills: 3 | Status: CANCELLED | OUTPATIENT
Start: 2021-12-30 | End: 2022-12-30

## 2022-10-05 ENCOUNTER — PATIENT MESSAGE (OUTPATIENT)
Dept: FAMILY MEDICINE | Facility: CLINIC | Age: 52
End: 2022-10-05

## 2022-10-05 ENCOUNTER — TELEPHONE (OUTPATIENT)
Dept: FAMILY MEDICINE | Facility: CLINIC | Age: 52
End: 2022-10-05

## 2022-10-05 DIAGNOSIS — R79.89 LOW TSH LEVEL: Primary | ICD-10-CM

## 2022-10-05 NOTE — TELEPHONE ENCOUNTER
Lab work completed with Cardiology reviewed.  TSH level is low (< 0.005).  Patient needs to complete additional labs to further assess - TSH, T4, T3, and TSH-receptor antibodies.   These labs are specific and will need to be completed at [facility].  Pending these lab results, will consider starting Methimazole or referring to endocrinology in the future.

## 2022-10-05 NOTE — TELEPHONE ENCOUNTER
Spoke to pt verbatim per H. Pt voiced  understanding.   He is working out of town right now . He will see what lab is near him. He will call back or send a portal message with this information.

## 2022-10-05 NOTE — TELEPHONE ENCOUNTER
[facility] faxed labs. Shows abnormal TSH. Pt has upcoming appt. Labs scanned to media.

## 2022-10-10 ENCOUNTER — PATIENT MESSAGE (OUTPATIENT)
Dept: FAMILY MEDICINE | Facility: CLINIC | Age: 52
End: 2022-10-10

## 2022-10-10 DIAGNOSIS — E05.00 GRAVES DISEASE: Primary | ICD-10-CM

## 2022-10-10 RX ORDER — METHIMAZOLE 10 MG/1
10 TABLET ORAL DAILY
Qty: 90 TABLET | Refills: 1 | Status: SHIPPED | OUTPATIENT
Start: 2022-10-10 | End: 2023-01-06 | Stop reason: SDUPTHER

## 2022-10-10 NOTE — TELEPHONE ENCOUNTER
Please contact patient and inform him that his lab work was reviewed.  It appears patient has a condition called Graves Disease, which is a form of hyperthyroidism.  Secondary to this, I recommend starting Methimazole 10 mg q.d. and to repeat lab work in 1 month to reassess thyroid levels.  Also, this condition is typically managed by a specialist and I recommend following up with Dr. Thurston (endocrinology).  Medication has been sent to pharmacy listed and a referral has been sent endocrinology today.

## 2022-10-11 ENCOUNTER — TELEPHONE (OUTPATIENT)
Dept: ENDOCRINOLOGY | Facility: CLINIC | Age: 52
End: 2022-10-11
Payer: COMMERCIAL

## 2022-10-11 ENCOUNTER — PATIENT MESSAGE (OUTPATIENT)
Dept: FAMILY MEDICINE | Facility: CLINIC | Age: 52
End: 2022-10-11

## 2022-10-13 ENCOUNTER — PATIENT MESSAGE (OUTPATIENT)
Dept: FAMILY MEDICINE | Facility: CLINIC | Age: 52
End: 2022-10-13

## 2022-10-24 ENCOUNTER — PATIENT MESSAGE (OUTPATIENT)
Dept: FAMILY MEDICINE | Facility: CLINIC | Age: 52
End: 2022-10-24

## 2022-10-24 DIAGNOSIS — R05.9 COUGH, UNSPECIFIED TYPE: Primary | ICD-10-CM

## 2022-11-23 ENCOUNTER — PATIENT MESSAGE (OUTPATIENT)
Dept: FAMILY MEDICINE | Facility: CLINIC | Age: 52
End: 2022-11-23

## 2022-11-23 DIAGNOSIS — A60.00 GENITAL HERPES SIMPLEX, UNSPECIFIED SITE: Primary | ICD-10-CM

## 2022-11-23 RX ORDER — VALACYCLOVIR HYDROCHLORIDE 500 MG/1
500 TABLET, FILM COATED ORAL 2 TIMES DAILY
Qty: 10 TABLET | Refills: 0 | Status: SHIPPED | OUTPATIENT
Start: 2022-11-23 | End: 2023-06-27 | Stop reason: SDUPTHER

## 2022-11-23 NOTE — TELEPHONE ENCOUNTER
A prescription of Valtrex 500 mg b.i.d. x 5 days has been sent to his pharmacy listed.  Patient is scheduled to follow-up next month.  Keep follow-up appointment as scheduled.

## 2022-12-07 ENCOUNTER — TELEPHONE (OUTPATIENT)
Dept: FAMILY MEDICINE | Facility: CLINIC | Age: 52
End: 2022-12-07

## 2022-12-08 ENCOUNTER — PATIENT MESSAGE (OUTPATIENT)
Dept: FAMILY MEDICINE | Facility: CLINIC | Age: 52
End: 2022-12-08

## 2022-12-14 ENCOUNTER — TELEPHONE (OUTPATIENT)
Dept: FAMILY MEDICINE | Facility: CLINIC | Age: 52
End: 2022-12-14

## 2022-12-14 NOTE — TELEPHONE ENCOUNTER
----- Message from Chiquis Guillaume MA sent at 12/14/2022  9:36 AM CST -----    ----- Message -----  From: Karishma Gutierrez  Sent: 12/14/2022   9:34 AM CST  To: Dionicio Sanchez Staff    Vm- wife godwin is calling to get in Fever of 102, been with grandkids who had strep and would like to be seen   [phone]

## 2022-12-14 NOTE — TELEPHONE ENCOUNTER
----- Message from Mindy Thomas sent at 12/14/2022  8:27 AM CST -----  Patient called and stated that he need to come in today he has a fever,bodyache, and cough he feel he may have strep throat please call [phone]

## 2022-12-15 LAB
T3FREE SERPL-MCNC: 2.8 PG/ML (ref 2.3–4.2)
T4 FREE SERPL-MCNC: 1 NG/DL (ref 0.8–1.8)
TSH SERPL-ACNC: 1 MIU/L (ref 0.4–4.5)

## 2022-12-19 ENCOUNTER — OFFICE VISIT (OUTPATIENT)
Dept: FAMILY MEDICINE | Facility: CLINIC | Age: 52
End: 2022-12-19
Payer: COMMERCIAL

## 2022-12-19 VITALS
HEART RATE: 74 BPM | OXYGEN SATURATION: 99 % | BODY MASS INDEX: 25.33 KG/M2 | HEIGHT: 74 IN | WEIGHT: 197.38 LBS | SYSTOLIC BLOOD PRESSURE: 156 MMHG | DIASTOLIC BLOOD PRESSURE: 88 MMHG | TEMPERATURE: 98 F

## 2022-12-19 DIAGNOSIS — R05.9 COUGH, UNSPECIFIED TYPE: ICD-10-CM

## 2022-12-19 DIAGNOSIS — Z12.5 PROSTATE CANCER SCREENING: ICD-10-CM

## 2022-12-19 DIAGNOSIS — I10 ESSENTIAL HYPERTENSION: ICD-10-CM

## 2022-12-19 DIAGNOSIS — E78.2 MIXED HYPERLIPIDEMIA: ICD-10-CM

## 2022-12-19 DIAGNOSIS — E05.00 GRAVES DISEASE: ICD-10-CM

## 2022-12-19 DIAGNOSIS — K21.9 GASTROESOPHAGEAL REFLUX DISEASE, UNSPECIFIED WHETHER ESOPHAGITIS PRESENT: Primary | ICD-10-CM

## 2022-12-19 DIAGNOSIS — Z23 NEED FOR INFLUENZA VACCINATION: ICD-10-CM

## 2022-12-19 PROCEDURE — 1160F RVW MEDS BY RX/DR IN RCRD: CPT | Mod: CPTII,S$GLB,, | Performed by: PHYSICIAN ASSISTANT

## 2022-12-19 PROCEDURE — 3008F PR BODY MASS INDEX (BMI) DOCUMENTED: ICD-10-PCS | Mod: CPTII,S$GLB,, | Performed by: PHYSICIAN ASSISTANT

## 2022-12-19 PROCEDURE — 3079F PR MOST RECENT DIASTOLIC BLOOD PRESSURE 80-89 MM HG: ICD-10-PCS | Mod: CPTII,S$GLB,, | Performed by: PHYSICIAN ASSISTANT

## 2022-12-19 PROCEDURE — 3079F DIAST BP 80-89 MM HG: CPT | Mod: CPTII,S$GLB,, | Performed by: PHYSICIAN ASSISTANT

## 2022-12-19 PROCEDURE — 3008F BODY MASS INDEX DOCD: CPT | Mod: CPTII,S$GLB,, | Performed by: PHYSICIAN ASSISTANT

## 2022-12-19 PROCEDURE — 1159F MED LIST DOCD IN RCRD: CPT | Mod: CPTII,S$GLB,, | Performed by: PHYSICIAN ASSISTANT

## 2022-12-19 PROCEDURE — 1159F PR MEDICATION LIST DOCUMENTED IN MEDICAL RECORD: ICD-10-PCS | Mod: CPTII,S$GLB,, | Performed by: PHYSICIAN ASSISTANT

## 2022-12-19 PROCEDURE — 99214 OFFICE O/P EST MOD 30 MIN: CPT | Mod: S$GLB,,, | Performed by: PHYSICIAN ASSISTANT

## 2022-12-19 PROCEDURE — 3077F SYST BP >= 140 MM HG: CPT | Mod: CPTII,S$GLB,, | Performed by: PHYSICIAN ASSISTANT

## 2022-12-19 PROCEDURE — 1160F PR REVIEW ALL MEDS BY PRESCRIBER/CLIN PHARMACIST DOCUMENTED: ICD-10-PCS | Mod: CPTII,S$GLB,, | Performed by: PHYSICIAN ASSISTANT

## 2022-12-19 PROCEDURE — 99214 PR OFFICE/OUTPT VISIT, EST, LEVL IV, 30-39 MIN: ICD-10-PCS | Mod: S$GLB,,, | Performed by: PHYSICIAN ASSISTANT

## 2022-12-19 PROCEDURE — 3077F PR MOST RECENT SYSTOLIC BLOOD PRESSURE >= 140 MM HG: ICD-10-PCS | Mod: CPTII,S$GLB,, | Performed by: PHYSICIAN ASSISTANT

## 2022-12-19 RX ORDER — OMEPRAZOLE 40 MG/1
40 CAPSULE, DELAYED RELEASE ORAL DAILY
Qty: 90 CAPSULE | Refills: 1 | Status: SHIPPED | OUTPATIENT
Start: 2022-12-19 | End: 2023-01-06

## 2022-12-19 NOTE — PROGRESS NOTES
"  SUBJECTIVE:    Patient ID: Dimitri Lott is a 52 y.o. male.    Chief Complaint: Annual Exam (No bottles/annual exam/ discuss recent dx of hyperthyroid/ cold on the 12/12/2022 with fever feels better now/ discuss flu vaccine/mp)    Pt is a 52 y.o. male with a PMHx of HLP (Lipitor 40mg) and Hyperthyroidism (Methimazole 10mg) who presents today for an annual visit.  He continues to experience a chronic, dry nonproductive cough.  This cough has been present for nearly 2 years.  It is typically exacerbated after drinking coffee or eating caffeinated products such as chocolate.  Also, the cough is exacerbated when laying supine.  He denies any abdominal pain, acid reflux, or N/V/D.  He has an appointment scheduled with Dr. Camp (Pulmonology) on 02/23/2023 regarding this chronic cough.  He has attempted OTC antihistamines thinking it was sinus related, but has not experienced any significant relief.    BP is noted to be elevated in office today.  Patient states he has had high blood pressure for 10 + years, but is not interested in starting any "more medications." He follows up with Dr. Santiago (Cardiology in [place]) and has a UTD nuclear stress test (10/12/2022) - EF: 69%, no significant perfusion deficits noted.  He denies any frequent headaches, dizziness, CP, or palpitations.    UTD: C-scope (3/2019) - Dr. Faulkner - RTC 5-10 yrs.    Lab work is UTD and was completed with Cardiology on 9/29/22.    Due for influenza vaccine.    Patient Message on 10/10/2022   Component Date Value Ref Range Status    TSH 12/14/2022 1.00  0.40 - 4.50 mIU/L Final    T4, Free 12/14/2022 1.0  0.8 - 1.8 ng/dL Final    T3, Free 12/14/2022 2.8  2.3 - 4.2 pg/mL Final       Past Medical History:   Diagnosis Date    Hyperlipidemia      Past Surgical History:   Procedure Laterality Date    COLONOSCOPY  2019    Dr. -RTC 5-10 years     History reviewed. No pertinent family history.    Marital Status:   Alcohol History:  " has no history on file for alcohol use.  Tobacco History:  reports that he has never smoked. He has never used smokeless tobacco.  Drug History:  has no history on file for drug use.    Health Maintenance Topics with due status: Not Due       Topic Last Completion Date    Colorectal Cancer Screening 03/26/2019    TETANUS VACCINE 12/12/2019    Lipid Panel 12/02/2021     Immunization History   Administered Date(s) Administered    Influenza 12/02/2019    Influenza - Quadrivalent 10/14/2019    Td (ADULT) 12/12/2019       Review of patient's allergies indicates:  No Known Allergies    Current Outpatient Medications:     atorvastatin (LIPITOR) 40 MG tablet, Take 1 tablet (40 mg total) by mouth once daily., Disp: 90 tablet, Rfl: 3    krill oil 500 mg Cap, Take 1 capsule by mouth 2 (two) times daily., Disp: , Rfl:     methIMAzole (TAPAZOLE) 10 MG Tab, Take 1 tablet (10 mg total) by mouth once daily., Disp: 90 tablet, Rfl: 1    omeprazole (PRILOSEC) 40 MG capsule, Take 1 capsule (40 mg total) by mouth once daily., Disp: 90 capsule, Rfl: 1    valACYclovir (VALTREX) 500 MG tablet, Take 1 tablet (500 mg total) by mouth 2 (two) times daily. for 5 days, Disp: 10 tablet, Rfl: 0    Review of Systems   Constitutional:  Negative for chills, fatigue and fever.   HENT:  Negative for congestion, ear discharge, ear pain, postnasal drip, rhinorrhea and sore throat.    Eyes:  Negative for photophobia, pain and visual disturbance.   Respiratory:  Positive for cough. Negative for shortness of breath and wheezing.    Cardiovascular:  Negative for chest pain, palpitations and leg swelling.   Gastrointestinal:  Negative for abdominal pain, constipation, diarrhea, nausea and vomiting.   Endocrine: Negative for cold intolerance and heat intolerance.   Genitourinary:  Negative for decreased urine volume, difficulty urinating, dysuria, frequency, hematuria and urgency.   Musculoskeletal:  Negative for arthralgias and myalgias.   Skin:  Negative for  "rash.   Neurological:  Negative for dizziness, syncope, light-headedness and headaches.   Psychiatric/Behavioral:  Negative for behavioral problems.         Objective:      Vitals:    12/19/22 0824 12/19/22 0831 12/19/22 0858   BP: (!) 142/86 (!) 152/90 (!) 156/88   Pulse: 74     Temp: 97.9 °F (36.6 °C)     SpO2: 99%     Weight: 89.5 kg (197 lb 6.4 oz)     Height: 6' 1.6" (1.869 m)       Physical Exam  Vitals and nursing note reviewed.   Constitutional:       General: He is not in acute distress.     Appearance: Normal appearance. He is normal weight. He is not ill-appearing, toxic-appearing or diaphoretic.   HENT:      Head: Normocephalic and atraumatic.      Right Ear: External ear normal.      Left Ear: External ear normal.      Nose: Nose normal. No rhinorrhea.      Mouth/Throat:      Mouth: Mucous membranes are moist.      Pharynx: Oropharynx is clear.   Eyes:      General: No scleral icterus.        Right eye: No discharge.         Left eye: No discharge.      Extraocular Movements: Extraocular movements intact.      Conjunctiva/sclera: Conjunctivae normal.   Neck:      Thyroid: No thyromegaly.      Vascular: No JVD.   Cardiovascular:      Rate and Rhythm: Normal rate and regular rhythm.      Pulses: Normal pulses.      Heart sounds: Normal heart sounds. No murmur heard.    No friction rub.      Comments: No displaced PMI or palpable thrill noted.  Pulmonary:      Effort: Pulmonary effort is normal. No respiratory distress.      Breath sounds: Normal breath sounds. No stridor. No wheezing, rhonchi or rales.   Abdominal:      General: There is no distension.      Palpations: Abdomen is soft.      Tenderness: There is no abdominal tenderness. There is no right CVA tenderness, left CVA tenderness, guarding or rebound.   Musculoskeletal:         General: No swelling or tenderness. Normal range of motion.      Cervical back: Normal range of motion and neck supple. No rigidity.      Right lower leg: No edema.      " Left lower leg: No edema.      Comments: 90 degree flexion at the waist.  5/5 strength in the bilateral lower and upper extremities.    Skin:     General: Skin is warm and dry.      Coloration: Skin is not jaundiced.      Findings: No erythema, lesion or rash.   Neurological:      General: No focal deficit present.      Mental Status: He is alert. Mental status is at baseline.      Cranial Nerves: No cranial nerve deficit.      Gait: Gait normal.   Psychiatric:         Mood and Affect: Mood normal.         Behavior: Behavior normal. Behavior is cooperative.         Assessment:       1. Gastroesophageal reflux disease, unspecified whether esophagitis present    2. Cough, unspecified type    3. Essential hypertension    4. Mixed hyperlipidemia    5. Need for influenza vaccination    6. Prostate cancer screening    7. Graves disease           Plan:       Gastroesophageal reflux disease, unspecified whether esophagitis present  Suspected cause of patient's chronic dry nonproductive cough.  Start Omeprazole 40 mg q.d..  Avoid foods/drinks with high caffeine contents such as coffee, coke, chocolate.  Remain upright for 60 minutes after eating.   -     omeprazole (PRILOSEC) 40 MG capsule; Take 1 capsule (40 mg total) by mouth once daily.  Dispense: 90 capsule; Refill: 1    Cough, unspecified type  Suspected cause at this time is GERD related - start Omeprazole 40 mg q.d..  Continue following up with pulmonology - Dr. Camp - as already scheduled on 02/23/2023.    Essential hypertension  BP elevated in office today.    Strongly recommended patient starting antihypertensive medications today, but patient deferred all options.  Patient states he does not want to take any more medications.  I educated patient that he is at an increased risk of cardiovascular events such as stroke, heart attack, and even death.  Patient voiced his understanding, but elected to proceed without any antihypertensive medications at this  time.  Begin following a low-sodium diet and begin monitoring BP b.i.d. and return for a BP check/nurse visit in 2 weeks.  If BP remains elevated on BP check, will consider starting ACEi or ARB therapy.    Mixed hyperlipidemia  Last lipid panel (2022) - Tot Chol; 128, LDL: 75, Tri, HDL: 33  Continue Lipitor 40 mg q.d. - no refills requested today.    Need for influenza vaccination  Offered influenza vaccine today, but patient declined.    Prostate cancer screening  Due for updated PSA - lab ordered and to be completed today.  -     PSA, Screening; Future; Expected date: 2022    Graves disease  TSH (22): 1.00  T4 (22): 1.00  T3: 2.8  Continue Methimazole 10 mg q.d. -no refills requested today.    Follow up in about 2 weeks (around 2023) for BP Check-Up w/Nurse, 3 month for HTN.        2022 Migel Pearson PA-C

## 2022-12-23 ENCOUNTER — PATIENT MESSAGE (OUTPATIENT)
Dept: FAMILY MEDICINE | Facility: CLINIC | Age: 52
End: 2022-12-23

## 2022-12-24 ENCOUNTER — PATIENT MESSAGE (OUTPATIENT)
Dept: FAMILY MEDICINE | Facility: CLINIC | Age: 52
End: 2022-12-24

## 2022-12-29 ENCOUNTER — PATIENT MESSAGE (OUTPATIENT)
Dept: FAMILY MEDICINE | Facility: CLINIC | Age: 52
End: 2022-12-29

## 2022-12-29 LAB — PSA SERPL-MCNC: 1.44 NG/ML

## 2022-12-30 ENCOUNTER — PATIENT MESSAGE (OUTPATIENT)
Dept: FAMILY MEDICINE | Facility: CLINIC | Age: 52
End: 2022-12-30

## 2022-12-30 ENCOUNTER — TELEPHONE (OUTPATIENT)
Dept: FAMILY MEDICINE | Facility: CLINIC | Age: 52
End: 2022-12-30

## 2022-12-30 NOTE — TELEPHONE ENCOUNTER
----- Message from DAPHNE Eller sent at 12/30/2022  1:42 PM CST -----  Please contact patient and inform him that his PSA is excellent at 1.44.

## 2022-12-30 NOTE — TELEPHONE ENCOUNTER
Spoke with pt in regards to recent lab results. Verbalized per Migel that PSA is excellent at 1.44. Pt acknowledge understanding.

## 2023-01-03 ENCOUNTER — PATIENT MESSAGE (OUTPATIENT)
Dept: FAMILY MEDICINE | Facility: CLINIC | Age: 53
End: 2023-01-03

## 2023-01-03 ENCOUNTER — CLINICAL SUPPORT (OUTPATIENT)
Dept: FAMILY MEDICINE | Facility: CLINIC | Age: 53
End: 2023-01-03

## 2023-01-03 VITALS — SYSTOLIC BLOOD PRESSURE: 132 MMHG | DIASTOLIC BLOOD PRESSURE: 88 MMHG

## 2023-01-03 DIAGNOSIS — I10 ESSENTIAL HYPERTENSION: Primary | ICD-10-CM

## 2023-01-03 RX ORDER — LISINOPRIL 10 MG/1
10 TABLET ORAL DAILY
Qty: 90 TABLET | Refills: 1 | Status: SHIPPED | OUTPATIENT
Start: 2023-01-03 | End: 2023-01-06

## 2023-01-03 NOTE — PROGRESS NOTES
Pt is here for a nurse visit bp check. Pt has no complaints. Tulio Lainez Lisinopril 10mg daily. Nurse visit 2 week 1/17

## 2023-01-06 ENCOUNTER — OFFICE VISIT (OUTPATIENT)
Dept: ENDOCRINOLOGY | Facility: CLINIC | Age: 53
End: 2023-01-06
Payer: COMMERCIAL

## 2023-01-06 VITALS
WEIGHT: 200.81 LBS | BODY MASS INDEX: 26.62 KG/M2 | SYSTOLIC BLOOD PRESSURE: 138 MMHG | HEART RATE: 72 BPM | OXYGEN SATURATION: 99 % | HEIGHT: 73 IN | DIASTOLIC BLOOD PRESSURE: 80 MMHG

## 2023-01-06 DIAGNOSIS — E05.90 HYPERTHYROIDISM: Primary | ICD-10-CM

## 2023-01-06 DIAGNOSIS — E78.5 HYPERLIPIDEMIA, UNSPECIFIED HYPERLIPIDEMIA TYPE: ICD-10-CM

## 2023-01-06 PROCEDURE — 3075F SYST BP GE 130 - 139MM HG: CPT | Mod: CPTII,S$GLB,, | Performed by: INTERNAL MEDICINE

## 2023-01-06 PROCEDURE — 3079F DIAST BP 80-89 MM HG: CPT | Mod: CPTII,S$GLB,, | Performed by: INTERNAL MEDICINE

## 2023-01-06 PROCEDURE — 4010F PR ACE/ARB THEARPY RXD/TAKEN: ICD-10-PCS | Mod: CPTII,S$GLB,, | Performed by: INTERNAL MEDICINE

## 2023-01-06 PROCEDURE — 3079F PR MOST RECENT DIASTOLIC BLOOD PRESSURE 80-89 MM HG: ICD-10-PCS | Mod: CPTII,S$GLB,, | Performed by: INTERNAL MEDICINE

## 2023-01-06 PROCEDURE — 99999 PR PBB SHADOW E&M-EST. PATIENT-LVL III: CPT | Mod: PBBFAC,,, | Performed by: INTERNAL MEDICINE

## 2023-01-06 PROCEDURE — 1159F MED LIST DOCD IN RCRD: CPT | Mod: CPTII,S$GLB,, | Performed by: INTERNAL MEDICINE

## 2023-01-06 PROCEDURE — 4010F ACE/ARB THERAPY RXD/TAKEN: CPT | Mod: CPTII,S$GLB,, | Performed by: INTERNAL MEDICINE

## 2023-01-06 PROCEDURE — 3075F PR MOST RECENT SYSTOLIC BLOOD PRESS GE 130-139MM HG: ICD-10-PCS | Mod: CPTII,S$GLB,, | Performed by: INTERNAL MEDICINE

## 2023-01-06 PROCEDURE — 1159F PR MEDICATION LIST DOCUMENTED IN MEDICAL RECORD: ICD-10-PCS | Mod: CPTII,S$GLB,, | Performed by: INTERNAL MEDICINE

## 2023-01-06 PROCEDURE — 3008F BODY MASS INDEX DOCD: CPT | Mod: CPTII,S$GLB,, | Performed by: INTERNAL MEDICINE

## 2023-01-06 PROCEDURE — 3008F PR BODY MASS INDEX (BMI) DOCUMENTED: ICD-10-PCS | Mod: CPTII,S$GLB,, | Performed by: INTERNAL MEDICINE

## 2023-01-06 PROCEDURE — 1160F PR REVIEW ALL MEDS BY PRESCRIBER/CLIN PHARMACIST DOCUMENTED: ICD-10-PCS | Mod: CPTII,S$GLB,, | Performed by: INTERNAL MEDICINE

## 2023-01-06 PROCEDURE — 99204 OFFICE O/P NEW MOD 45 MIN: CPT | Mod: S$GLB,,, | Performed by: INTERNAL MEDICINE

## 2023-01-06 PROCEDURE — 1160F RVW MEDS BY RX/DR IN RCRD: CPT | Mod: CPTII,S$GLB,, | Performed by: INTERNAL MEDICINE

## 2023-01-06 PROCEDURE — 99999 PR PBB SHADOW E&M-EST. PATIENT-LVL III: ICD-10-PCS | Mod: PBBFAC,,, | Performed by: INTERNAL MEDICINE

## 2023-01-06 PROCEDURE — 99204 PR OFFICE/OUTPT VISIT, NEW, LEVL IV, 45-59 MIN: ICD-10-PCS | Mod: S$GLB,,, | Performed by: INTERNAL MEDICINE

## 2023-01-06 RX ORDER — METHIMAZOLE 10 MG/1
10 TABLET ORAL DAILY
Qty: 90 TABLET | Refills: 1 | Status: SHIPPED | OUTPATIENT
Start: 2023-01-06 | End: 2023-03-17 | Stop reason: SDUPTHER

## 2023-01-06 NOTE — PROGRESS NOTES
CHIEF COMPLAINT: Hyperthyroidism  52 y.o. old being seen as a new patient. States around Sept 2022 was having palpitations and insomnia. He had lost weight. Saw cardiology  and found to have a suppressed TSH. On tapazole 10 mg daily. Started Approx Oct 2022. Palpitations improved. Insomnia improved. Weight coming back up. No diarrhea or constipation. He does travel.       PAST MEDICAL HISTORY/PAST SURGICAL HISTORY:  Reviewed in [facility]    SOCIAL HISTORY: Reviewed in [facility]    FAMILY HISTORY:  No known thyroid disease. No DM. No other known autoimmune disorders.     MEDICATIONS/ALLERGIES: The patient's MedCard has been updated and reviewed.            PE:    GENERAL: Well developed, well nourished.  NECK: Supple, trachea midline, no palpable thyroid nodules.  CHEST: Resp even and unlabored, CTA bilateral.  CARDIAC: RRR, S1, S2 heard, no murmurs, rubs, S3, or S4  SKIN: no acanthosis nigracans.    LABS/Radiology   Latest Reference Range & Units 12/14/22 09:25   TSH 0.40 - 4.50 mIU/L 1.00   T3, Free 2.3 - 4.2 pg/mL 2.8   T4, Free 0.8 - 1.8 ng/dL 1.0       Labs from 10/06/2022 lap or  Thyrotropin receptor antibody -2.78    9/29/22  TSH <0.005    ASSESSMENT/PLAN:  Hyperthyroidism-TRAB positive.  Started on Tapazole approximately October of 2022.  TFTs now within normal limits.  Symptoms that he was having previously have since improved.  Continue current dose of medication.  Reviewed side effects including impact on LFTs, agranulocytosis and vasculitis.  Gave information in AVS.  Currently appears to be tolerating well.  He does travel for work so advise be could do labs at [employer] wherever he is.  Sent in refills for methimazole.    2. Hyperlipidemia-currently on statin.  Appears to be tolerating well.  Elevated TSH can impact lipids.      FOLLOWUP  3 Months with with With TSH, Ft4, CMP  F/U 6 months with TSH, Ft4, CMP

## 2023-01-14 ENCOUNTER — PATIENT MESSAGE (OUTPATIENT)
Dept: FAMILY MEDICINE | Facility: CLINIC | Age: 53
End: 2023-01-14

## 2023-01-17 ENCOUNTER — PATIENT MESSAGE (OUTPATIENT)
Dept: FAMILY MEDICINE | Facility: CLINIC | Age: 53
End: 2023-01-17

## 2023-01-17 VITALS — DIASTOLIC BLOOD PRESSURE: 84 MMHG | SYSTOLIC BLOOD PRESSURE: 135 MMHG

## 2023-02-15 ENCOUNTER — TELEPHONE (OUTPATIENT)
Dept: ENDOCRINOLOGY | Facility: CLINIC | Age: 53
End: 2023-02-15
Payer: COMMERCIAL

## 2023-02-15 NOTE — TELEPHONE ENCOUNTER
"Pt called wanting to get his BP checked. States he got hit in the eye with a pickle ball the other day. Has seen optometry and is cleared from them. Is not experiencing any palpitations/hyperthyroid symptoms to be checked from an endocrine standpoint. Also mentions he is not "feeling well" but states that is only in relation to him not sleeping well the night before. Also states he has a BP cuff at home that is not working. Advised pt he would need to reach out to his PCP for a BP check and evaluation. Verbalized understanding.   "

## 2023-02-15 NOTE — TELEPHONE ENCOUNTER
----- Message from Rogelio Ibarra sent at 2/15/2023 10:51 AM CST -----  Who Called: patient         What is the reqeust in detail: Requesting call back to discuss if pt could pop in office and get his blood pressure checked         Can the clinic reply by [facility]? no         Best Call Back Number: [phone]           Additional Information:

## 2023-02-16 ENCOUNTER — CLINICAL SUPPORT (OUTPATIENT)
Dept: FAMILY MEDICINE | Facility: CLINIC | Age: 53
End: 2023-02-16
Payer: COMMERCIAL

## 2023-02-16 VITALS — DIASTOLIC BLOOD PRESSURE: 80 MMHG | SYSTOLIC BLOOD PRESSURE: 136 MMHG

## 2023-02-16 DIAGNOSIS — Z13.5 SCREENING FOR EYE CONDITION: Primary | ICD-10-CM

## 2023-02-16 NOTE — PROGRESS NOTES
Pt here for BP check, has Lisinopril 10 mg on hand if needed. Also would like referral to ophthalmologist got hit in the right eye playing pickle ball and is now seeing floaters.    Per Dr. Sanchez BP looks good continue as is and if needed start taking BP med.

## 2023-02-23 ENCOUNTER — HOSPITAL ENCOUNTER (OUTPATIENT)
Dept: RADIOLOGY | Facility: HOSPITAL | Age: 53
Discharge: HOME | End: 2023-02-23
Attending: INTERNAL MEDICINE
Payer: COMMERCIAL

## 2023-02-23 ENCOUNTER — LAB VISIT (OUTPATIENT)
Dept: LAB | Facility: HOSPITAL | Age: 53
End: 2023-02-23
Attending: INTERNAL MEDICINE
Payer: COMMERCIAL

## 2023-02-23 ENCOUNTER — OFFICE VISIT (OUTPATIENT)
Dept: PULMONOLOGY | Facility: CLINIC | Age: 53
End: 2023-02-23
Payer: COMMERCIAL

## 2023-02-23 VITALS
BODY MASS INDEX: 26.52 KG/M2 | HEART RATE: 68 BPM | WEIGHT: 201 LBS | DIASTOLIC BLOOD PRESSURE: 80 MMHG | SYSTOLIC BLOOD PRESSURE: 143 MMHG | OXYGEN SATURATION: 98 %

## 2023-02-23 DIAGNOSIS — R05.9 COUGH, UNSPECIFIED TYPE: ICD-10-CM

## 2023-02-23 LAB
BASOPHILS # BLD AUTO: 0.04 K/UL (ref 0–0.2)
BASOPHILS NFR BLD: 0.7 % (ref 0–1.9)
DIFFERENTIAL METHOD: ABNORMAL
EOSINOPHIL # BLD AUTO: 0.3 K/UL (ref 0–0.5)
EOSINOPHIL NFR BLD: 5.8 % (ref 0–8)
ERYTHROCYTE [DISTWIDTH] IN BLOOD BY AUTOMATED COUNT: 12.6 % (ref 11.5–14.5)
HCT VFR BLD AUTO: 44.1 % (ref 40–54)
HGB BLD-MCNC: 14.9 G/DL (ref 14–18)
IMM GRANULOCYTES # BLD AUTO: 0.02 K/UL (ref 0–0.04)
IMM GRANULOCYTES NFR BLD AUTO: 0.4 % (ref 0–0.5)
LYMPHOCYTES # BLD AUTO: 1.5 K/UL (ref 1–4.8)
LYMPHOCYTES NFR BLD: 28 % (ref 18–48)
MCH RBC QN AUTO: 30.4 PG (ref 27–31)
MCHC RBC AUTO-ENTMCNC: 33.8 G/DL (ref 32–36)
MCV RBC AUTO: 90 FL (ref 82–98)
MONOCYTES # BLD AUTO: 0.4 K/UL (ref 0.3–1)
MONOCYTES NFR BLD: 7.3 % (ref 4–15)
NEUTROPHILS # BLD AUTO: 3.1 K/UL (ref 1.8–7.7)
NEUTROPHILS NFR BLD: 57.8 % (ref 38–73)
NRBC BLD-RTO: 0 /100 WBC
PLATELET # BLD AUTO: 231 K/UL (ref 150–450)
PMV BLD AUTO: 9.1 FL (ref 9.2–12.9)
RBC # BLD AUTO: 4.9 M/UL (ref 4.6–6.2)
WBC # BLD AUTO: 5.35 K/UL (ref 3.9–12.7)

## 2023-02-23 PROCEDURE — 3077F PR MOST RECENT SYSTOLIC BLOOD PRESSURE >= 140 MM HG: ICD-10-PCS | Mod: CPTII,S$GLB,, | Performed by: INTERNAL MEDICINE

## 2023-02-23 PROCEDURE — 1159F PR MEDICATION LIST DOCUMENTED IN MEDICAL RECORD: ICD-10-PCS | Mod: CPTII,S$GLB,, | Performed by: INTERNAL MEDICINE

## 2023-02-23 PROCEDURE — 4010F PR ACE/ARB THEARPY RXD/TAKEN: ICD-10-PCS | Mod: CPTII,S$GLB,, | Performed by: INTERNAL MEDICINE

## 2023-02-23 PROCEDURE — 3079F DIAST BP 80-89 MM HG: CPT | Mod: CPTII,S$GLB,, | Performed by: INTERNAL MEDICINE

## 2023-02-23 PROCEDURE — 3008F PR BODY MASS INDEX (BMI) DOCUMENTED: ICD-10-PCS | Mod: CPTII,S$GLB,, | Performed by: INTERNAL MEDICINE

## 2023-02-23 PROCEDURE — 3079F PR MOST RECENT DIASTOLIC BLOOD PRESSURE 80-89 MM HG: ICD-10-PCS | Mod: CPTII,S$GLB,, | Performed by: INTERNAL MEDICINE

## 2023-02-23 PROCEDURE — 1159F MED LIST DOCD IN RCRD: CPT | Mod: CPTII,S$GLB,, | Performed by: INTERNAL MEDICINE

## 2023-02-23 PROCEDURE — 1160F PR REVIEW ALL MEDS BY PRESCRIBER/CLIN PHARMACIST DOCUMENTED: ICD-10-PCS | Mod: CPTII,S$GLB,, | Performed by: INTERNAL MEDICINE

## 2023-02-23 PROCEDURE — 99204 OFFICE O/P NEW MOD 45 MIN: CPT | Mod: S$GLB,,, | Performed by: INTERNAL MEDICINE

## 2023-02-23 PROCEDURE — 85025 COMPLETE CBC W/AUTO DIFF WBC: CPT | Performed by: INTERNAL MEDICINE

## 2023-02-23 PROCEDURE — 71046 X-RAY EXAM CHEST 2 VIEWS: CPT | Mod: TC

## 2023-02-23 PROCEDURE — 99204 PR OFFICE/OUTPT VISIT, NEW, LEVL IV, 45-59 MIN: ICD-10-PCS | Mod: S$GLB,,, | Performed by: INTERNAL MEDICINE

## 2023-02-23 PROCEDURE — 4010F ACE/ARB THERAPY RXD/TAKEN: CPT | Mod: CPTII,S$GLB,, | Performed by: INTERNAL MEDICINE

## 2023-02-23 PROCEDURE — 3077F SYST BP >= 140 MM HG: CPT | Mod: CPTII,S$GLB,, | Performed by: INTERNAL MEDICINE

## 2023-02-23 PROCEDURE — 82785 ASSAY OF IGE: CPT | Performed by: INTERNAL MEDICINE

## 2023-02-23 PROCEDURE — 3008F BODY MASS INDEX DOCD: CPT | Mod: CPTII,S$GLB,, | Performed by: INTERNAL MEDICINE

## 2023-02-23 PROCEDURE — 1160F RVW MEDS BY RX/DR IN RCRD: CPT | Mod: CPTII,S$GLB,, | Performed by: INTERNAL MEDICINE

## 2023-02-23 NOTE — ASSESSMENT & PLAN NOTE
· Possible asthma  · Check PFT and methacholine challenge study  · Check IgE and CBC with diff because of allergy history  · Check CXR  · Does not seem to be related to PND or reflux  · RTC 1 month

## 2023-02-23 NOTE — PROGRESS NOTES
"New Office Visit/Consultation Note *    Patient Name: Dimitri Lott  MRN: [id number]  : 1970      Reason for visit: Cough    HPI:     2023 - Here for evaluation of chronic cough,  has had for years no seasonality.  Has had a trial of postnasal drip treatment with no improvement.  He has never been a smoker.  He has a h/o allergies and was treated in past.  Also has a h/o "exercise induced asthma".  He has had a trial of reflux treatment.      3/7/2023 - PFT (3/3/2023)  Normal study    10/16/2023 - PFT (10/2/23)  Negative methacholine challenge study    Past Medical History    Past Medical History:   Diagnosis Date    Hyperlipidemia     Hyperthyroidism        Past Surgical History    Past Surgical History:   Procedure Laterality Date    COLONOSCOPY  2019    Dr. -[facility] 5-10 years       Medications      Current Outpatient Medications:     krill oil 500 mg Cap, Take 1 capsule by mouth 2 (two) times daily., Disp: , Rfl:     methIMAzole (TAPAZOLE) 10 MG Tab, Take 1 tablet (10 mg total) by mouth once daily., Disp: 90 tablet, Rfl: 1    atorvastatin (LIPITOR) 40 MG tablet, Take 1 tablet (40 mg total) by mouth once daily., Disp: 90 tablet, Rfl: 3    valACYclovir (VALTREX) 500 MG tablet, Take 1 tablet (500 mg total) by mouth 2 (two) times daily. for 5 days, Disp: 10 tablet, Rfl: 0    Allergies    Review of patient's allergies indicates:  No Known Allergies    SocHx    Social History     Tobacco Use   Smoking Status Never   Smokeless Tobacco Never       Social History     Substance and Sexual Activity   Alcohol Use Not Currently    Comment: occasional       Drug Use - no  Occupation -  for pipeline  Asbestos exposure - no  Pets - none at this time    FMHx    History reviewed. No pertinent family history.      Review of Systems  Review of Systems   Constitutional:  Negative for chills, diaphoresis, fever, malaise/fatigue and weight loss.        + allergies   HENT:  Negative for congestion.    Eyes:  " Negative for pain.   Respiratory:  Positive for cough. Negative for hemoptysis, sputum production, shortness of breath, wheezing and stridor.    Cardiovascular:  Negative for chest pain, palpitations, orthopnea, claudication, leg swelling and PND.   Gastrointestinal:  Negative for abdominal pain, constipation, diarrhea, heartburn, nausea and vomiting.   Genitourinary:  Negative for dysuria, frequency and urgency.   Musculoskeletal:  Negative for falls and myalgias.   Neurological:  Negative for sensory change, focal weakness and weakness.   Psychiatric/Behavioral:  Negative for depression. The patient is not nervous/anxious.      Physical Exam    Vitals:    02/23/23 0917   BP: (!) 143/80   BP Location: Left arm   Patient Position: Sitting   BP Method: Medium (Manual)   Pulse: 68   SpO2: 98%   Weight: 91.2 kg (201 lb)       Physical Exam  Vitals and nursing note reviewed.   Constitutional:       General: He is not in acute distress.     Appearance: Normal appearance. He is well-developed. He is not ill-appearing, toxic-appearing or diaphoretic.   HENT:      Head: Normocephalic and atraumatic.      Right Ear: External ear normal.      Left Ear: External ear normal.      Nose: Nose normal.      Mouth/Throat:      Mouth: Mucous membranes are moist.      Pharynx: Oropharynx is clear. No oropharyngeal exudate or posterior oropharyngeal erythema.   Eyes:      General: No scleral icterus.        Right eye: No discharge.         Left eye: No discharge.      Extraocular Movements: Extraocular movements intact.      Conjunctiva/sclera: Conjunctivae normal.      Pupils: Pupils are equal, round, and reactive to light.   Neck:      Thyroid: No thyromegaly.      Vascular: No carotid bruit or JVD.      Trachea: No tracheal deviation.   Cardiovascular:      Rate and Rhythm: Normal rate and regular rhythm.      Heart sounds: Normal heart sounds. No murmur heard.    No friction rub. No gallop.   Pulmonary:      Effort: Pulmonary  effort is normal. No respiratory distress.      Breath sounds: Normal breath sounds. No stridor. No wheezing, rhonchi or rales.   Chest:      Chest wall: No tenderness.   Abdominal:      General: Bowel sounds are normal. There is no distension.      Palpations: Abdomen is soft.      Tenderness: There is no abdominal tenderness. There is no guarding.   Musculoskeletal:         General: No tenderness. Normal range of motion.      Cervical back: Normal range of motion and neck supple. No rigidity or tenderness.      Right lower leg: No edema.      Left lower leg: No edema.   Lymphadenopathy:      Cervical: No cervical adenopathy.   Skin:     Capillary Refill: Capillary refill takes less than 2 seconds.   Neurological:      General: No focal deficit present.      Mental Status: He is alert and oriented to person, place, and time. Mental status is at baseline.      Cranial Nerves: No cranial nerve deficit.      Motor: No weakness.      Deep Tendon Reflexes: Reflexes are normal and symmetric.   Psychiatric:         Mood and Affect: Mood normal.         Behavior: Behavior normal.         Thought Content: Thought content normal.         Judgment: Judgment normal.       Labs    Lab Results   Component Value Date    WBC 5.6 12/02/2021    HGB 15.9 12/02/2021    HCT 47.0 12/02/2021     12/02/2021       Sodium   Date Value Ref Range Status   12/02/2021 138 135 - 146 mmol/L Final     Potassium   Date Value Ref Range Status   12/02/2021 4.4 3.5 - 5.3 mmol/L Final     Chloride   Date Value Ref Range Status   12/02/2021 104 98 - 110 mmol/L Final     CO2   Date Value Ref Range Status   12/02/2021 24 20 - 32 mmol/L Final     Glucose   Date Value Ref Range Status   12/02/2021 86 65 - 99 mg/dL Final     Comment:                   Fasting reference interval          BUN   Date Value Ref Range Status   12/02/2021 14 7 - 25 mg/dL Final     Creatinine   Date Value Ref Range Status   12/02/2021 0.93 0.70 - 1.33 mg/dL Final      Comment:     For patients >49 years of age, the reference limit  for Creatinine is approximately 13% higher for people  identified as -American.          Calcium   Date Value Ref Range Status   12/02/2021 9.6 8.6 - 10.3 mg/dL Final     Total Protein   Date Value Ref Range Status   12/02/2021 7.4 6.1 - 8.1 g/dL Final     Albumin   Date Value Ref Range Status   12/02/2021 4.8 3.6 - 5.1 g/dL Final     Total Bilirubin   Date Value Ref Range Status   12/02/2021 0.9 0.2 - 1.2 mg/dL Final     Alkaline Phosphatase   Date Value Ref Range Status   01/13/2020 88 40 - 115 U/L Final     AST   Date Value Ref Range Status   12/02/2021 22 10 - 35 U/L Final     ALT   Date Value Ref Range Status   12/02/2021 35 9 - 46 U/L Final       Xrays        Impression/Plan    Problem List Items Addressed This Visit          Pulmonary    Cough     Possible asthma  Check PFT and methacholine challenge study  Check IgE and CBC with diff because of allergy history  Check CXR  Does not seem to be related to PND or reflux  RTC 1 month         Relevant Orders    CBC auto differential    IgE    X-Ray Chest PA And Lateral    Complete PFT w/ bronchodilator       I have spent about 45 minutes with the patient taking the history and examining the patient.  We have discussed the diagnoses and current plan and all questions have been answered.  We have discussed the follow up plan.  The patient and family (if present) know to contact the office with any questions they may have.        Nathan Camp MD

## 2023-03-01 LAB — IGE: 89 IU/ML (ref 6–495)

## 2023-03-03 ENCOUNTER — HOSPITAL ENCOUNTER (OUTPATIENT)
Dept: PULMONOLOGY | Facility: HOSPITAL | Age: 53
Discharge: HOME | End: 2023-03-03
Attending: INTERNAL MEDICINE
Payer: COMMERCIAL

## 2023-03-03 DIAGNOSIS — R05.9 COUGH, UNSPECIFIED TYPE: ICD-10-CM

## 2023-03-03 PROCEDURE — 94727 GAS DIL/WSHOT DETER LNG VOL: CPT

## 2023-03-03 PROCEDURE — 94729 DIFFUSING CAPACITY: CPT

## 2023-03-03 PROCEDURE — 94010 BREATHING CAPACITY TEST: CPT

## 2023-03-06 ENCOUNTER — PATIENT MESSAGE (OUTPATIENT)
Dept: FAMILY MEDICINE | Facility: CLINIC | Age: 53
End: 2023-03-06

## 2023-03-06 DIAGNOSIS — E78.2 MIXED HYPERLIPIDEMIA: ICD-10-CM

## 2023-03-06 RX ORDER — ATORVASTATIN CALCIUM 40 MG/1
40 TABLET, FILM COATED ORAL DAILY
Qty: 90 TABLET | Refills: 3 | Status: SHIPPED | OUTPATIENT
Start: 2023-03-06 | End: 2024-03-05

## 2023-03-22 ENCOUNTER — TELEPHONE (OUTPATIENT)
Dept: PULMONOLOGY | Facility: CLINIC | Age: 53
End: 2023-03-22

## 2023-05-07 ENCOUNTER — PATIENT MESSAGE (OUTPATIENT)
Dept: ENDOCRINOLOGY | Facility: CLINIC | Age: 53
End: 2023-05-07
Payer: COMMERCIAL

## 2023-05-08 ENCOUNTER — PATIENT MESSAGE (OUTPATIENT)
Dept: ENDOCRINOLOGY | Facility: CLINIC | Age: 53
End: 2023-05-08
Payer: COMMERCIAL

## 2023-05-09 DIAGNOSIS — E05.90 HYPERTHYROIDISM: Primary | ICD-10-CM

## 2023-05-11 ENCOUNTER — PATIENT MESSAGE (OUTPATIENT)
Dept: ENDOCRINOLOGY | Facility: CLINIC | Age: 53
End: 2023-05-11
Payer: COMMERCIAL

## 2023-05-12 DIAGNOSIS — E05.90 HYPERTHYROIDISM: Primary | ICD-10-CM

## 2023-06-04 ENCOUNTER — PATIENT MESSAGE (OUTPATIENT)
Dept: FAMILY MEDICINE | Facility: CLINIC | Age: 53
End: 2023-06-04

## 2023-06-05 ENCOUNTER — PATIENT MESSAGE (OUTPATIENT)
Dept: FAMILY MEDICINE | Facility: CLINIC | Age: 53
End: 2023-06-05

## 2023-06-05 DIAGNOSIS — R79.89 LOW TSH LEVEL: Primary | ICD-10-CM

## 2023-06-05 DIAGNOSIS — E05.00 GRAVES DISEASE: ICD-10-CM

## 2023-06-08 ENCOUNTER — PATIENT MESSAGE (OUTPATIENT)
Dept: FAMILY MEDICINE | Facility: CLINIC | Age: 53
End: 2023-06-08

## 2023-06-11 ENCOUNTER — PATIENT MESSAGE (OUTPATIENT)
Dept: FAMILY MEDICINE | Facility: CLINIC | Age: 53
End: 2023-06-11

## 2023-06-16 ENCOUNTER — TELEPHONE (OUTPATIENT)
Dept: ENDOCRINOLOGY | Facility: CLINIC | Age: 53
End: 2023-06-16
Payer: COMMERCIAL

## 2023-06-16 ENCOUNTER — PATIENT MESSAGE (OUTPATIENT)
Dept: ENDOCRINOLOGY | Facility: CLINIC | Age: 53
End: 2023-06-16
Payer: COMMERCIAL

## 2023-06-16 DIAGNOSIS — E05.90 HYPERTHYROIDISM: Primary | ICD-10-CM

## 2023-06-16 RX ORDER — METHIMAZOLE 5 MG/1
5 TABLET ORAL DAILY
Qty: 30 TABLET | Refills: 11 | Status: SHIPPED | OUTPATIENT
Start: 2023-06-16 | End: 2023-09-24

## 2023-06-16 NOTE — TELEPHONE ENCOUNTER
----- Message from Amina Wood sent at 6/16/2023 11:15 AM CDT -----  Type:  Test Results    Who Called:  pt's wife  Name of Test (Lab/Mammo/Etc):  Labs  Date of Test:  5/12/23  Ordering Provider:  Oswaldo  Where the test was performed:  Outside of [facility]  Best Call Back Number:  [phone]  Additional Information:  Please call the pt back today asap. Thanks!

## 2023-06-16 NOTE — TELEPHONE ENCOUNTER
Let patient know I reviewed results  TSH 18.88  FT4 0.9  AST 20  ALT 25  Alk phos 90    Decrease tapazole to 5 mg daily  Check TSH, Ft4, CMP in 6 weeks

## 2023-06-16 NOTE — TELEPHONE ENCOUNTER
S/w wife and notified her to call the place pt had labs done and fax it to us. Wife verb understanding

## 2023-06-26 ENCOUNTER — PATIENT MESSAGE (OUTPATIENT)
Dept: FAMILY MEDICINE | Facility: CLINIC | Age: 53
End: 2023-06-26

## 2023-06-26 DIAGNOSIS — A60.00 GENITAL HERPES SIMPLEX, UNSPECIFIED SITE: ICD-10-CM

## 2023-06-26 DIAGNOSIS — E05.00 GRAVES DISEASE: Primary | ICD-10-CM

## 2023-06-27 RX ORDER — VALACYCLOVIR HYDROCHLORIDE 500 MG/1
500 TABLET, FILM COATED ORAL 2 TIMES DAILY
Qty: 20 TABLET | Refills: 3 | Status: SHIPPED | OUTPATIENT
Start: 2023-06-27 | End: 2023-11-20 | Stop reason: SDUPTHER

## 2023-06-29 ENCOUNTER — PATIENT MESSAGE (OUTPATIENT)
Dept: FAMILY MEDICINE | Facility: CLINIC | Age: 53
End: 2023-06-29

## 2023-07-05 ENCOUNTER — PATIENT MESSAGE (OUTPATIENT)
Dept: FAMILY MEDICINE | Facility: CLINIC | Age: 53
End: 2023-07-05

## 2023-07-13 ENCOUNTER — PATIENT MESSAGE (OUTPATIENT)
Dept: FAMILY MEDICINE | Facility: CLINIC | Age: 53
End: 2023-07-13

## 2023-07-14 ENCOUNTER — PATIENT MESSAGE (OUTPATIENT)
Dept: FAMILY MEDICINE | Facility: CLINIC | Age: 53
End: 2023-07-14

## 2023-07-18 ENCOUNTER — PATIENT MESSAGE (OUTPATIENT)
Dept: FAMILY MEDICINE | Facility: CLINIC | Age: 53
End: 2023-07-18

## 2023-07-18 ENCOUNTER — PATIENT MESSAGE (OUTPATIENT)
Dept: ENDOCRINOLOGY | Facility: CLINIC | Age: 53
End: 2023-07-18
Payer: COMMERCIAL

## 2023-07-19 ENCOUNTER — PATIENT MESSAGE (OUTPATIENT)
Dept: ENDOCRINOLOGY | Facility: CLINIC | Age: 53
End: 2023-07-19
Payer: COMMERCIAL

## 2023-07-21 ENCOUNTER — LAB VISIT (OUTPATIENT)
Dept: LAB | Facility: HOSPITAL | Age: 53
End: 2023-07-21
Attending: INTERNAL MEDICINE
Payer: COMMERCIAL

## 2023-07-21 ENCOUNTER — CLINICAL SUPPORT (OUTPATIENT)
Dept: FAMILY MEDICINE | Facility: CLINIC | Age: 53
End: 2023-07-21
Payer: COMMERCIAL

## 2023-07-21 VITALS — SYSTOLIC BLOOD PRESSURE: 142 MMHG | DIASTOLIC BLOOD PRESSURE: 80 MMHG

## 2023-07-21 DIAGNOSIS — I10 ESSENTIAL HYPERTENSION: Primary | ICD-10-CM

## 2023-07-21 DIAGNOSIS — E05.90 HYPERTHYROIDISM: ICD-10-CM

## 2023-07-21 LAB
ALBUMIN SERPL BCP-MCNC: 4.4 G/DL (ref 3.5–5.2)
ALP SERPL-CCNC: 100 U/L (ref 55–135)
ALT SERPL W/O P-5'-P-CCNC: 24 U/L (ref 10–44)
ANION GAP SERPL CALC-SCNC: 12 MMOL/L (ref 8–16)
AST SERPL-CCNC: 21 U/L (ref 10–40)
BILIRUB SERPL-MCNC: 1 MG/DL (ref 0.1–1)
BUN SERPL-MCNC: 15 MG/DL (ref 6–20)
CALCIUM SERPL-MCNC: 9.5 MG/DL (ref 8.7–10.5)
CHLORIDE SERPL-SCNC: 105 MMOL/L (ref 95–110)
CO2 SERPL-SCNC: 23 MMOL/L (ref 23–29)
CREAT SERPL-MCNC: 1.3 MG/DL (ref 0.5–1.4)
EST. GFR  (NO RACE VARIABLE): >60 ML/MIN/1.73 M^2
GLUCOSE SERPL-MCNC: 103 MG/DL (ref 70–110)
POTASSIUM SERPL-SCNC: 3.8 MMOL/L (ref 3.5–5.1)
PROT SERPL-MCNC: 7.5 G/DL (ref 6–8.4)
SODIUM SERPL-SCNC: 140 MMOL/L (ref 136–145)
T4 FREE SERPL-MCNC: 0.8 NG/DL (ref 0.71–1.51)
TSH SERPL DL<=0.005 MIU/L-ACNC: 10.19 UIU/ML (ref 0.4–4)

## 2023-07-21 PROCEDURE — 84443 ASSAY THYROID STIM HORMONE: CPT | Performed by: INTERNAL MEDICINE

## 2023-07-21 PROCEDURE — 80053 COMPREHEN METABOLIC PANEL: CPT | Performed by: INTERNAL MEDICINE

## 2023-07-21 PROCEDURE — 36415 COLL VENOUS BLD VENIPUNCTURE: CPT | Mod: PN | Performed by: INTERNAL MEDICINE

## 2023-07-21 PROCEDURE — 84439 ASSAY OF FREE THYROXINE: CPT | Performed by: INTERNAL MEDICINE

## 2023-07-21 NOTE — PROGRESS NOTES
Pt came in for BP check. Pt stated at home he has been bp in the 140's. Per Dr. Sanchez Losartan 25mg once a day and come back in 2 weeks. Pt verbalized understanding

## 2023-07-22 RX ORDER — LOSARTAN POTASSIUM 25 MG/1
25 TABLET ORAL DAILY
Qty: 90 TABLET | Refills: 3 | Status: SHIPPED | OUTPATIENT
Start: 2023-07-22 | End: 2023-11-28 | Stop reason: SDUPTHER

## 2023-07-28 ENCOUNTER — OFFICE VISIT (OUTPATIENT)
Dept: ENDOCRINOLOGY | Facility: CLINIC | Age: 53
End: 2023-07-28
Payer: COMMERCIAL

## 2023-07-28 VITALS
WEIGHT: 207.13 LBS | SYSTOLIC BLOOD PRESSURE: 164 MMHG | HEIGHT: 73 IN | BODY MASS INDEX: 27.45 KG/M2 | HEART RATE: 71 BPM | DIASTOLIC BLOOD PRESSURE: 84 MMHG | OXYGEN SATURATION: 97 %

## 2023-07-28 DIAGNOSIS — I10 BENIGN ESSENTIAL HTN: ICD-10-CM

## 2023-07-28 DIAGNOSIS — E05.90 HYPERTHYROIDISM: Primary | ICD-10-CM

## 2023-07-28 PROCEDURE — 99999 PR PBB SHADOW E&M-EST. PATIENT-LVL III: CPT | Mod: PBBFAC,,, | Performed by: INTERNAL MEDICINE

## 2023-07-28 PROCEDURE — 1159F PR MEDICATION LIST DOCUMENTED IN MEDICAL RECORD: ICD-10-PCS | Mod: CPTII,S$GLB,, | Performed by: INTERNAL MEDICINE

## 2023-07-28 PROCEDURE — 1159F MED LIST DOCD IN RCRD: CPT | Mod: CPTII,S$GLB,, | Performed by: INTERNAL MEDICINE

## 2023-07-28 PROCEDURE — 3077F SYST BP >= 140 MM HG: CPT | Mod: CPTII,S$GLB,, | Performed by: INTERNAL MEDICINE

## 2023-07-28 PROCEDURE — 99999 PR PBB SHADOW E&M-EST. PATIENT-LVL III: ICD-10-PCS | Mod: PBBFAC,,, | Performed by: INTERNAL MEDICINE

## 2023-07-28 PROCEDURE — 4010F PR ACE/ARB THEARPY RXD/TAKEN: ICD-10-PCS | Mod: CPTII,S$GLB,, | Performed by: INTERNAL MEDICINE

## 2023-07-28 PROCEDURE — 1160F PR REVIEW ALL MEDS BY PRESCRIBER/CLIN PHARMACIST DOCUMENTED: ICD-10-PCS | Mod: CPTII,S$GLB,, | Performed by: INTERNAL MEDICINE

## 2023-07-28 PROCEDURE — 3077F PR MOST RECENT SYSTOLIC BLOOD PRESSURE >= 140 MM HG: ICD-10-PCS | Mod: CPTII,S$GLB,, | Performed by: INTERNAL MEDICINE

## 2023-07-28 PROCEDURE — 1160F RVW MEDS BY RX/DR IN RCRD: CPT | Mod: CPTII,S$GLB,, | Performed by: INTERNAL MEDICINE

## 2023-07-28 PROCEDURE — 3079F PR MOST RECENT DIASTOLIC BLOOD PRESSURE 80-89 MM HG: ICD-10-PCS | Mod: CPTII,S$GLB,, | Performed by: INTERNAL MEDICINE

## 2023-07-28 PROCEDURE — 4010F ACE/ARB THERAPY RXD/TAKEN: CPT | Mod: CPTII,S$GLB,, | Performed by: INTERNAL MEDICINE

## 2023-07-28 PROCEDURE — 99214 OFFICE O/P EST MOD 30 MIN: CPT | Mod: S$GLB,,, | Performed by: INTERNAL MEDICINE

## 2023-07-28 PROCEDURE — 99214 PR OFFICE/OUTPT VISIT, EST, LEVL IV, 30-39 MIN: ICD-10-PCS | Mod: S$GLB,,, | Performed by: INTERNAL MEDICINE

## 2023-07-28 PROCEDURE — 3079F DIAST BP 80-89 MM HG: CPT | Mod: CPTII,S$GLB,, | Performed by: INTERNAL MEDICINE

## 2023-07-28 PROCEDURE — 3008F BODY MASS INDEX DOCD: CPT | Mod: CPTII,S$GLB,, | Performed by: INTERNAL MEDICINE

## 2023-07-28 PROCEDURE — 3008F PR BODY MASS INDEX (BMI) DOCUMENTED: ICD-10-PCS | Mod: CPTII,S$GLB,, | Performed by: INTERNAL MEDICINE

## 2023-07-28 NOTE — PROGRESS NOTES
CHIEF COMPLAINT: Hyperthyroidism  53 y.o. old being seen as f/u. States around Sept 2022 was having palpitations and insomnia.  Saw cardiology  and found to have a suppressed TSH. . Started Approx Oct 2022.   On tapazole 5 mg daily. States he had some symptoms that caused him to go back to 10 mg. However, has been back on the 5 mg for 1 weeks. No palpitations. No tremors. No anxiety. Has some fatigue. States has not been taking BP meds.          PAST MEDICAL HISTORY/PAST SURGICAL HISTORY:  Reviewed in [facility]    SOCIAL HISTORY: Reviewed in [facility]    FAMILY HISTORY:  No known thyroid disease. No DM. No other known autoimmune disorders.     MEDICATIONS/ALLERGIES: The patient's MedCard has been updated and reviewed.            PE:    GENERAL: Well developed, well nourished.  NECK: Supple, trachea midline, no palpable thyroid nodules.  CHEST: Resp even and unlabored, CTA bilateral.  CARDIAC: RRR, S1, S2 heard, no murmurs, rubs, S3, or S4         Latest Reference Range & Units 07/21/23 13:21   TSH 0.400 - 4.000 uIU/mL 10.193 (H)   Free T4 0.71 - 1.51 ng/dL 0.80   (H): Data is abnormally high    Labs from 10/06/2022 lap or  Thyrotropin receptor antibody -2.78      05/22/2023-[facility]   Bilirubin 1.1   Alkaline phosphatase 90  AST 20   ALT 25   TSH 18.8   Free T4 0.9    ASSESSMENT/PLAN:  Hyperthyroidism-TRAB positive.  Started on Tapazole approximately October of 2022.  Although we decrease the methimazole to 5 mg daily, appears that he went back up to 10 mg on its own.  Was concerned about side effects that may not have been thyroid related in the end.  Went back down to 5 mg of methimazole last week.  Since only recently start taking 5 mg, will repeat TFTs in 6 weeks.    2. Hypertension-states has not been taking blood pressure medication.  Elevated TSH can impact blood pressure.  Advise also following up with PCP to follow up with his blood pressure.    FOLLOWUP  6 weeks- TSH, Ft4, CMP  F/U 6 months with TSH, Ft4, CMP

## 2023-07-31 ENCOUNTER — TELEPHONE (OUTPATIENT)
Dept: FAMILY MEDICINE | Facility: CLINIC | Age: 53
End: 2023-07-31

## 2023-07-31 NOTE — TELEPHONE ENCOUNTER
----- Message from Karishma Gutierrez sent at 7/31/2023 12:32 PM CDT -----  Pt is calling about the referral to the endocrinologist. They are saying they need more info. Dr. Agusto plunkett in [place]. He sent a message to specify   [phone]

## 2023-08-03 ENCOUNTER — PATIENT MESSAGE (OUTPATIENT)
Dept: FAMILY MEDICINE | Facility: CLINIC | Age: 53
End: 2023-08-03

## 2023-08-04 ENCOUNTER — PATIENT MESSAGE (OUTPATIENT)
Dept: FAMILY MEDICINE | Facility: CLINIC | Age: 53
End: 2023-08-04

## 2023-09-03 ENCOUNTER — PATIENT MESSAGE (OUTPATIENT)
Dept: ENDOCRINOLOGY | Facility: CLINIC | Age: 53
End: 2023-09-03
Payer: COMMERCIAL

## 2023-09-05 ENCOUNTER — PATIENT MESSAGE (OUTPATIENT)
Dept: ENDOCRINOLOGY | Facility: CLINIC | Age: 53
End: 2023-09-05
Payer: COMMERCIAL

## 2023-09-18 ENCOUNTER — LAB VISIT (OUTPATIENT)
Dept: LAB | Facility: HOSPITAL | Age: 53
End: 2023-09-18
Attending: INTERNAL MEDICINE
Payer: COMMERCIAL

## 2023-09-18 DIAGNOSIS — E05.90 HYPERTHYROIDISM: ICD-10-CM

## 2023-09-18 LAB
ALBUMIN SERPL BCP-MCNC: 4.5 G/DL (ref 3.5–5.2)
ALP SERPL-CCNC: 97 U/L (ref 55–135)
ALT SERPL W/O P-5'-P-CCNC: 36 U/L (ref 10–44)
ANION GAP SERPL CALC-SCNC: 12 MMOL/L (ref 8–16)
AST SERPL-CCNC: 28 U/L (ref 10–40)
BILIRUB SERPL-MCNC: 1.2 MG/DL (ref 0.1–1)
BUN SERPL-MCNC: 12 MG/DL (ref 6–20)
CALCIUM SERPL-MCNC: 9.7 MG/DL (ref 8.7–10.5)
CHLORIDE SERPL-SCNC: 103 MMOL/L (ref 95–110)
CO2 SERPL-SCNC: 24 MMOL/L (ref 23–29)
CREAT SERPL-MCNC: 1.2 MG/DL (ref 0.5–1.4)
EST. GFR  (NO RACE VARIABLE): >60 ML/MIN/1.73 M^2
GLUCOSE SERPL-MCNC: 99 MG/DL (ref 70–110)
POTASSIUM SERPL-SCNC: 4.3 MMOL/L (ref 3.5–5.1)
PROT SERPL-MCNC: 7.8 G/DL (ref 6–8.4)
SODIUM SERPL-SCNC: 139 MMOL/L (ref 136–145)
T4 FREE SERPL-MCNC: 0.85 NG/DL (ref 0.71–1.51)
TSH SERPL DL<=0.005 MIU/L-ACNC: 6.15 UIU/ML (ref 0.4–4)

## 2023-09-18 PROCEDURE — 84439 ASSAY OF FREE THYROXINE: CPT | Performed by: INTERNAL MEDICINE

## 2023-09-18 PROCEDURE — 80053 COMPREHEN METABOLIC PANEL: CPT | Performed by: INTERNAL MEDICINE

## 2023-09-18 PROCEDURE — 36415 COLL VENOUS BLD VENIPUNCTURE: CPT | Mod: PN | Performed by: INTERNAL MEDICINE

## 2023-09-18 PROCEDURE — 84443 ASSAY THYROID STIM HORMONE: CPT | Performed by: INTERNAL MEDICINE

## 2023-09-24 ENCOUNTER — TELEPHONE (OUTPATIENT)
Dept: ENDOCRINOLOGY | Facility: CLINIC | Age: 53
End: 2023-09-24
Payer: COMMERCIAL

## 2023-09-24 DIAGNOSIS — E05.90 HYPERTHYROIDISM: Primary | ICD-10-CM

## 2023-09-25 ENCOUNTER — TELEPHONE (OUTPATIENT)
Dept: PULMONOLOGY | Facility: CLINIC | Age: 53
End: 2023-09-25

## 2023-09-29 ENCOUNTER — TELEPHONE (OUTPATIENT)
Dept: FAMILY MEDICINE | Facility: CLINIC | Age: 53
End: 2023-09-29

## 2023-09-29 DIAGNOSIS — Z00.00 ROUTINE GENERAL MEDICAL EXAMINATION AT A HEALTH CARE FACILITY: ICD-10-CM

## 2023-09-29 DIAGNOSIS — Z79.899 ENCOUNTER FOR LONG-TERM (CURRENT) USE OF OTHER MEDICATIONS: Primary | ICD-10-CM

## 2023-09-29 DIAGNOSIS — E78.2 MIXED HYPERLIPIDEMIA: ICD-10-CM

## 2023-09-29 DIAGNOSIS — E78.00 PURE HYPERCHOLESTEROLEMIA: ICD-10-CM

## 2023-10-02 ENCOUNTER — HOSPITAL ENCOUNTER (OUTPATIENT)
Dept: PULMONOLOGY | Facility: HOSPITAL | Age: 53
Discharge: HOME | End: 2023-10-02
Attending: INTERNAL MEDICINE
Payer: COMMERCIAL

## 2023-10-02 VITALS — HEART RATE: 63 BPM | OXYGEN SATURATION: 99 % | RESPIRATION RATE: 16 BRPM

## 2023-10-02 DIAGNOSIS — R05.9 COUGH, UNSPECIFIED TYPE: ICD-10-CM

## 2023-10-02 PROCEDURE — 94060 EVALUATION OF WHEEZING: CPT

## 2023-10-02 PROCEDURE — 94070 EVALUATION OF WHEEZING: CPT

## 2023-10-02 RX ORDER — METHACHOLINE CHLORIDE 0.75/3ML
3 VIAL, NEBULIZER (ML) INHALATION ONCE
Status: COMPLETED | OUTPATIENT
Start: 2023-10-02 | End: 2023-10-02

## 2023-10-02 RX ORDER — SODIUM CHLORIDE FOR INHALATION 0.9 %
3 VIAL, NEBULIZER (ML) INHALATION ONCE
Status: DISCONTINUED | OUTPATIENT
Start: 2023-10-02 | End: 2023-10-03 | Stop reason: HOSPADM

## 2023-10-02 RX ORDER — METHACHOLINE CHLORIDE 0 MG/3 ML
3 VIAL, NEBULIZER (ML) INHALATION ONCE
Status: DISCONTINUED | OUTPATIENT
Start: 2023-10-02 | End: 2023-10-03 | Stop reason: HOSPADM

## 2023-10-02 RX ORDER — METHACHOLINE CHLORIDE 0.1875/3ML
3 VIAL, NEBULIZER (ML) INHALATION ONCE
Status: COMPLETED | OUTPATIENT
Start: 2023-10-02 | End: 2023-10-02

## 2023-10-02 RX ORDER — METHACHOLINE CHLORIDE 12 MG/3 ML
3 VIAL, NEBULIZER (ML) INHALATION ONCE
Status: COMPLETED | OUTPATIENT
Start: 2023-10-02 | End: 2023-10-02

## 2023-10-02 RX ORDER — METHACHOLINE CHLORIDE 3 MG/3 ML
3 VIAL, NEBULIZER (ML) INHALATION ONCE
Status: COMPLETED | OUTPATIENT
Start: 2023-10-02 | End: 2023-10-02

## 2023-10-02 RX ORDER — METHACHOLINE CHLORIDE 48 MG/3 ML
3 VIAL, NEBULIZER (ML) INHALATION ONCE
Status: COMPLETED | OUTPATIENT
Start: 2023-10-02 | End: 2023-10-02

## 2023-10-02 RX ADMIN — Medication 48 MG: at 03:10

## 2023-10-02 RX ADMIN — Medication 0.19 MG: at 03:10

## 2023-10-02 RX ADMIN — Medication 3 MG: at 03:10

## 2023-10-02 RX ADMIN — Medication 0.75 MG: at 03:10

## 2023-10-02 RX ADMIN — Medication 12 MG: at 03:10

## 2023-10-17 ENCOUNTER — TELEPHONE (OUTPATIENT)
Dept: PULMONOLOGY | Facility: CLINIC | Age: 53
End: 2023-10-17

## 2023-10-17 RX ORDER — ALBUTEROL SULFATE 90 UG/1
2 AEROSOL, METERED RESPIRATORY (INHALATION) EVERY 6 HOURS PRN
Qty: 18 G | Refills: 5 | Status: SHIPPED | OUTPATIENT
Start: 2023-10-17

## 2023-10-17 NOTE — TELEPHONE ENCOUNTER
----- Message from Nathan Camp MD sent at 10/16/2023  1:45 PM CDT -----  Methacholine challenge test is negative for asthma

## 2023-10-17 NOTE — TELEPHONE ENCOUNTER
Patient aware said when he was younger he was diagnosed with exercise induced asthma . He would like to see if we can give him an inhaler for when he does exercise he can breath better .

## 2023-10-30 ENCOUNTER — PATIENT MESSAGE (OUTPATIENT)
Dept: ENDOCRINOLOGY | Facility: CLINIC | Age: 53
End: 2023-10-30
Payer: COMMERCIAL

## 2023-10-30 ENCOUNTER — PATIENT MESSAGE (OUTPATIENT)
Dept: FAMILY MEDICINE | Facility: CLINIC | Age: 53
End: 2023-10-30

## 2023-10-31 ENCOUNTER — LAB VISIT (OUTPATIENT)
Dept: LAB | Facility: HOSPITAL | Age: 53
End: 2023-10-31
Attending: INTERNAL MEDICINE
Payer: COMMERCIAL

## 2023-10-31 ENCOUNTER — PATIENT MESSAGE (OUTPATIENT)
Dept: FAMILY MEDICINE | Facility: CLINIC | Age: 53
End: 2023-10-31

## 2023-10-31 DIAGNOSIS — E05.90 HYPERTHYROIDISM: ICD-10-CM

## 2023-10-31 LAB — TSH SERPL DL<=0.005 MIU/L-ACNC: 2.48 UIU/ML (ref 0.4–4)

## 2023-10-31 PROCEDURE — 36415 COLL VENOUS BLD VENIPUNCTURE: CPT | Mod: PN | Performed by: INTERNAL MEDICINE

## 2023-10-31 PROCEDURE — 84443 ASSAY THYROID STIM HORMONE: CPT | Performed by: INTERNAL MEDICINE

## 2023-11-01 ENCOUNTER — TELEPHONE (OUTPATIENT)
Dept: ENDOCRINOLOGY | Facility: CLINIC | Age: 53
End: 2023-11-01
Payer: COMMERCIAL

## 2023-11-18 ENCOUNTER — PATIENT MESSAGE (OUTPATIENT)
Dept: FAMILY MEDICINE | Facility: CLINIC | Age: 53
End: 2023-11-18

## 2023-11-18 DIAGNOSIS — A60.00 GENITAL HERPES SIMPLEX, UNSPECIFIED SITE: ICD-10-CM

## 2023-11-20 ENCOUNTER — PATIENT MESSAGE (OUTPATIENT)
Dept: FAMILY MEDICINE | Facility: CLINIC | Age: 53
End: 2023-11-20

## 2023-11-20 ENCOUNTER — LAB VISIT (OUTPATIENT)
Dept: LAB | Facility: HOSPITAL | Age: 53
End: 2023-11-20
Attending: INTERNAL MEDICINE
Payer: COMMERCIAL

## 2023-11-20 ENCOUNTER — PATIENT MESSAGE (OUTPATIENT)
Dept: ENDOCRINOLOGY | Facility: CLINIC | Age: 53
End: 2023-11-20
Payer: COMMERCIAL

## 2023-11-20 DIAGNOSIS — E05.90 HYPERTHYROIDISM: ICD-10-CM

## 2023-11-20 LAB
ALBUMIN SERPL BCP-MCNC: 4.5 G/DL (ref 3.5–5.2)
ALP SERPL-CCNC: 87 U/L (ref 55–135)
ALT SERPL W/O P-5'-P-CCNC: 28 U/L (ref 10–44)
ANION GAP SERPL CALC-SCNC: 9 MMOL/L (ref 8–16)
AST SERPL-CCNC: 26 U/L (ref 10–40)
BILIRUB SERPL-MCNC: 0.7 MG/DL (ref 0.1–1)
BUN SERPL-MCNC: 13 MG/DL (ref 6–20)
CALCIUM SERPL-MCNC: 9.7 MG/DL (ref 8.7–10.5)
CHLORIDE SERPL-SCNC: 106 MMOL/L (ref 95–110)
CO2 SERPL-SCNC: 25 MMOL/L (ref 23–29)
CREAT SERPL-MCNC: 1.2 MG/DL (ref 0.5–1.4)
EST. GFR  (NO RACE VARIABLE): >60 ML/MIN/1.73 M^2
GLUCOSE SERPL-MCNC: 107 MG/DL (ref 70–110)
POTASSIUM SERPL-SCNC: 4.5 MMOL/L (ref 3.5–5.1)
PROT SERPL-MCNC: 7.6 G/DL (ref 6–8.4)
SODIUM SERPL-SCNC: 140 MMOL/L (ref 136–145)
T4 FREE SERPL-MCNC: 0.98 NG/DL (ref 0.71–1.51)
TSH SERPL DL<=0.005 MIU/L-ACNC: 2.18 UIU/ML (ref 0.4–4)

## 2023-11-20 PROCEDURE — 84439 ASSAY OF FREE THYROXINE: CPT | Performed by: INTERNAL MEDICINE

## 2023-11-20 PROCEDURE — 36415 COLL VENOUS BLD VENIPUNCTURE: CPT | Mod: PN | Performed by: INTERNAL MEDICINE

## 2023-11-20 PROCEDURE — 80053 COMPREHEN METABOLIC PANEL: CPT | Performed by: INTERNAL MEDICINE

## 2023-11-20 PROCEDURE — 84443 ASSAY THYROID STIM HORMONE: CPT | Performed by: INTERNAL MEDICINE

## 2023-11-20 RX ORDER — VALACYCLOVIR HYDROCHLORIDE 500 MG/1
500 TABLET, FILM COATED ORAL 2 TIMES DAILY
Qty: 20 TABLET | Refills: 3 | Status: SHIPPED | OUTPATIENT
Start: 2023-11-20 | End: 2023-11-21 | Stop reason: SDUPTHER

## 2023-11-21 ENCOUNTER — OFFICE VISIT (OUTPATIENT)
Dept: FAMILY MEDICINE | Facility: CLINIC | Age: 53
End: 2023-11-21
Payer: COMMERCIAL

## 2023-11-21 VITALS
DIASTOLIC BLOOD PRESSURE: 82 MMHG | HEIGHT: 72 IN | OXYGEN SATURATION: 98 % | BODY MASS INDEX: 27.61 KG/M2 | HEART RATE: 66 BPM | WEIGHT: 203.81 LBS | SYSTOLIC BLOOD PRESSURE: 154 MMHG

## 2023-11-21 DIAGNOSIS — R00.2 PALPITATIONS: ICD-10-CM

## 2023-11-21 DIAGNOSIS — B00.9 HSV INFECTION: ICD-10-CM

## 2023-11-21 DIAGNOSIS — E78.2 MIXED HYPERLIPIDEMIA: ICD-10-CM

## 2023-11-21 DIAGNOSIS — E05.00 GRAVES DISEASE: ICD-10-CM

## 2023-11-21 DIAGNOSIS — G47.00 INSOMNIA, UNSPECIFIED TYPE: ICD-10-CM

## 2023-11-21 DIAGNOSIS — I10 ESSENTIAL HYPERTENSION: ICD-10-CM

## 2023-11-21 DIAGNOSIS — Z13.1 SCREENING FOR DIABETES MELLITUS: ICD-10-CM

## 2023-11-21 DIAGNOSIS — Z00.00 WELLNESS EXAMINATION: Primary | ICD-10-CM

## 2023-11-21 PROCEDURE — 3079F DIAST BP 80-89 MM HG: CPT | Mod: CPTII,S$GLB,,

## 2023-11-21 PROCEDURE — 3008F PR BODY MASS INDEX (BMI) DOCUMENTED: ICD-10-PCS | Mod: CPTII,S$GLB,,

## 2023-11-21 PROCEDURE — 3044F PR MOST RECENT HEMOGLOBIN A1C LEVEL <7.0%: ICD-10-PCS | Mod: CPTII,S$GLB,,

## 2023-11-21 PROCEDURE — 4010F ACE/ARB THERAPY RXD/TAKEN: CPT | Mod: CPTII,S$GLB,,

## 2023-11-21 PROCEDURE — 1159F MED LIST DOCD IN RCRD: CPT | Mod: CPTII,S$GLB,,

## 2023-11-21 PROCEDURE — 3077F PR MOST RECENT SYSTOLIC BLOOD PRESSURE >= 140 MM HG: ICD-10-PCS | Mod: CPTII,S$GLB,,

## 2023-11-21 PROCEDURE — 99396 PREV VISIT EST AGE 40-64: CPT | Mod: S$GLB,,,

## 2023-11-21 PROCEDURE — 3044F HG A1C LEVEL LT 7.0%: CPT | Mod: CPTII,S$GLB,,

## 2023-11-21 PROCEDURE — 3061F NEG MICROALBUMINURIA REV: CPT | Mod: CPTII,S$GLB,,

## 2023-11-21 PROCEDURE — 3066F NEPHROPATHY DOC TX: CPT | Mod: CPTII,S$GLB,,

## 2023-11-21 PROCEDURE — 4010F PR ACE/ARB THEARPY RXD/TAKEN: ICD-10-PCS | Mod: CPTII,S$GLB,,

## 2023-11-21 PROCEDURE — 3077F SYST BP >= 140 MM HG: CPT | Mod: CPTII,S$GLB,,

## 2023-11-21 PROCEDURE — 3079F PR MOST RECENT DIASTOLIC BLOOD PRESSURE 80-89 MM HG: ICD-10-PCS | Mod: CPTII,S$GLB,,

## 2023-11-21 PROCEDURE — 1159F PR MEDICATION LIST DOCUMENTED IN MEDICAL RECORD: ICD-10-PCS | Mod: CPTII,S$GLB,,

## 2023-11-21 PROCEDURE — 99396 PR PREVENTIVE VISIT,EST,40-64: ICD-10-PCS | Mod: S$GLB,,,

## 2023-11-21 PROCEDURE — 3061F PR NEG MICROALBUMINURIA RESULT DOCUMENTED/REVIEW: ICD-10-PCS | Mod: CPTII,S$GLB,,

## 2023-11-21 PROCEDURE — 3008F BODY MASS INDEX DOCD: CPT | Mod: CPTII,S$GLB,,

## 2023-11-21 PROCEDURE — 3066F PR DOCUMENTATION OF TREATMENT FOR NEPHROPATHY: ICD-10-PCS | Mod: CPTII,S$GLB,,

## 2023-11-21 RX ORDER — METHIMAZOLE 10 MG/1
10 TABLET ORAL
COMMUNITY
Start: 2023-10-23 | End: 2024-01-19 | Stop reason: SDUPTHER

## 2023-11-21 RX ORDER — VALACYCLOVIR HYDROCHLORIDE 500 MG/1
500 TABLET, FILM COATED ORAL 2 TIMES DAILY
Qty: 20 TABLET | Refills: 3 | Status: SHIPPED | OUTPATIENT
Start: 2023-11-21

## 2023-11-21 RX ORDER — HYDROXYZINE HYDROCHLORIDE 25 MG/1
25 TABLET, FILM COATED ORAL NIGHTLY PRN
Qty: 30 TABLET | Refills: 5 | Status: SHIPPED | OUTPATIENT
Start: 2023-11-21

## 2023-11-22 ENCOUNTER — TELEPHONE (OUTPATIENT)
Dept: FAMILY MEDICINE | Facility: CLINIC | Age: 53
End: 2023-11-22

## 2023-11-22 LAB
ALBUMIN/CREAT UR: 6 MCG/MG CREAT
APPEARANCE UR: CLEAR
BACTERIA #/AREA URNS HPF: NORMAL /HPF
BACTERIA UR CULT: NORMAL
BASOPHILS # BLD AUTO: 52 CELLS/UL (ref 0–200)
BASOPHILS NFR BLD AUTO: 0.9 %
BILIRUB UR QL STRIP: NEGATIVE
CHOLEST SERPL-MCNC: 178 MG/DL
CHOLEST/HDLC SERPL: 4 (CALC)
COLOR UR: YELLOW
CREAT UR-MCNC: 139 MG/DL (ref 20–320)
EOSINOPHIL # BLD AUTO: 534 CELLS/UL (ref 15–500)
EOSINOPHIL NFR BLD AUTO: 9.2 %
ERYTHROCYTE [DISTWIDTH] IN BLOOD BY AUTOMATED COUNT: 12.1 % (ref 11–15)
GLUCOSE UR QL STRIP: NEGATIVE
HBA1C MFR BLD: 5.5 % OF TOTAL HGB
HCT VFR BLD AUTO: 47.3 % (ref 38.5–50)
HDLC SERPL-MCNC: 44 MG/DL
HGB BLD-MCNC: 16.1 G/DL (ref 13.2–17.1)
HGB UR QL STRIP: NEGATIVE
HYALINE CASTS #/AREA URNS LPF: NORMAL /LPF
KETONES UR QL STRIP: NEGATIVE
LDLC SERPL CALC-MCNC: 105 MG/DL (CALC)
LEUKOCYTE ESTERASE UR QL STRIP: NEGATIVE
LYMPHOCYTES # BLD AUTO: 1618 CELLS/UL (ref 850–3900)
LYMPHOCYTES NFR BLD AUTO: 27.9 %
MAGNESIUM SERPL-MCNC: 2.3 MG/DL (ref 1.5–2.5)
MCH RBC QN AUTO: 30.7 PG (ref 27–33)
MCHC RBC AUTO-ENTMCNC: 34 G/DL (ref 32–36)
MCV RBC AUTO: 90.1 FL (ref 80–100)
MICROALBUMIN UR-MCNC: 0.8 MG/DL
MONOCYTES # BLD AUTO: 476 CELLS/UL (ref 200–950)
MONOCYTES NFR BLD AUTO: 8.2 %
NEUTROPHILS # BLD AUTO: 3120 CELLS/UL (ref 1500–7800)
NEUTROPHILS NFR BLD AUTO: 53.8 %
NITRITE UR QL STRIP: NEGATIVE
NONHDLC SERPL-MCNC: 134 MG/DL (CALC)
PH UR STRIP: 6.5 [PH] (ref 5–8)
PLATELET # BLD AUTO: 281 THOUSAND/UL (ref 140–400)
PMV BLD REES-ECKER: 10.1 FL (ref 7.5–12.5)
PROT UR QL STRIP: NEGATIVE
RBC # BLD AUTO: 5.25 MILLION/UL (ref 4.2–5.8)
RBC #/AREA URNS HPF: NORMAL /HPF
SERVICE CMNT-IMP: NORMAL
SP GR UR STRIP: 1.02 (ref 1–1.03)
SQUAMOUS #/AREA URNS HPF: NORMAL /HPF
TRIGL SERPL-MCNC: 172 MG/DL
WBC # BLD AUTO: 5.8 THOUSAND/UL (ref 3.8–10.8)
WBC #/AREA URNS HPF: NORMAL /HPF

## 2023-11-22 NOTE — PROGRESS NOTES
Let Mr. Lott know that overall his labs look good. His eosinophils are just barely above the range of normal-these are blood cells that respond to allergens. He would possibly benefit from a daily allergy medication like Allegra, or an allergy nasal spray like Flonase and this could also improve his long time cough.   His triglycerides have gone down from where they were a year ago from 323 to 172, which is great. Good cholesterol is stable and total cholesterol is good. Bad (LDL) went up a a tiny bit from 87 to 105 but is still acceptable.  Hgb A1C is 5.5% which is not even in a prediabetic range.   Waiting on one other result which I do not expect to be abnormal.  Thanks.

## 2023-11-22 NOTE — TELEPHONE ENCOUNTER
----- Message from ESTER Magana-CNP sent at 11/22/2023  7:54 AM CST -----  Let Mr. Lott know that overall his labs look good. His eosinophils are just barely above the range of normal-these are blood cells that respond to allergens. He would possibly benefit from a daily allergy medication like Allegra, or an allergy nasal spray like Flonase and this could also improve his long time cough.   His triglycerides have gone down from where they were a year ago from 323 to 172, which is great. Good cholesterol is stable and total cholesterol is good. Bad (LDL) went up a a tiny bit from 87 to 105 but is still acceptable.  Hgb A1C is 5.5% which is not even in a prediabetic range.   Waiting on one other result which I do not expect to be abnormal.  Thanks.

## 2023-11-24 ENCOUNTER — PATIENT MESSAGE (OUTPATIENT)
Dept: FAMILY MEDICINE | Facility: CLINIC | Age: 53
End: 2023-11-24

## 2023-11-27 ENCOUNTER — PATIENT MESSAGE (OUTPATIENT)
Dept: FAMILY MEDICINE | Facility: CLINIC | Age: 53
End: 2023-11-27

## 2023-11-28 ENCOUNTER — CLINICAL SUPPORT (OUTPATIENT)
Dept: FAMILY MEDICINE | Facility: CLINIC | Age: 53
End: 2023-11-28
Payer: COMMERCIAL

## 2023-11-28 VITALS — SYSTOLIC BLOOD PRESSURE: 160 MMHG | DIASTOLIC BLOOD PRESSURE: 80 MMHG | HEART RATE: 80 BPM

## 2023-11-28 DIAGNOSIS — I10 ESSENTIAL HYPERTENSION: ICD-10-CM

## 2023-11-28 RX ORDER — LOSARTAN POTASSIUM 100 MG/1
100 TABLET ORAL DAILY
Qty: 30 TABLET | Refills: 11 | Status: SHIPPED | OUTPATIENT
Start: 2023-11-28 | End: 2024-11-27

## 2023-11-28 NOTE — PROGRESS NOTES
Pt here for BP check. Has bp log on his phone. Brought home cuff. Pt states that he decided to increase his losartan to 50mg without discuss with karishma. Pt state bp was staying high.  Home readings:  11/24/23 -162/86  11/25//94  11/26//88  11/27//87  11/28//91    Denies HA/ Vision changes/ chest pain. Pt reports feeling fine.     Per Karishma. Increase Lisinopril to 100mg daily. Log blood pressure and come back in 2 weeks. Pt voiced understanding. States he will cont to monitor at home. NV scheduled.

## 2023-12-03 PROBLEM — R00.2 PALPITATIONS: Status: ACTIVE | Noted: 2023-12-03

## 2023-12-03 PROBLEM — B00.9 HSV INFECTION: Status: ACTIVE | Noted: 2023-12-03

## 2023-12-03 PROBLEM — I10 ESSENTIAL HYPERTENSION: Status: ACTIVE | Noted: 2023-12-03

## 2023-12-03 PROBLEM — E78.2 MIXED HYPERLIPIDEMIA: Status: ACTIVE | Noted: 2023-12-03

## 2023-12-03 PROBLEM — E05.00 GRAVES DISEASE: Status: ACTIVE | Noted: 2023-12-03

## 2023-12-03 PROBLEM — G47.00 INSOMNIA: Status: ACTIVE | Noted: 2023-12-03

## 2023-12-03 NOTE — PROGRESS NOTES
SUBJECTIVE:    Patient ID: Dimitri Lott is a 53 y.o. male.    Chief Complaint: Annual Exam (Annual exam/ discuss HTN, not taking HTN med/ had lab done for thyroid for Dr. Chacon/ fasting for other lab work needed/declines flu vaccine//mp)    53-year-old established male patient presents to clinic today for a checkup and to discuss his blood pressure and his medications.  He states that his blood pressure has been being elevated upon checking it lately.    Patient also reports insomnia lately.  He states that he feels like when he lays down to go to bed at night he can not get his brain to stop working.  He does occasionally have palpitations as well.  He does think this is related to his anxiety.  He also feels as though his blood pressure may also be related to some of his anxieties.    We did discuss health maintenance and cardiac risk factors as below:    Hepatitis C Screening Never done  Shingles Vaccine(1 of 2) Never done  Colorectal Cancer Screening due on 03/26/2024  Lipid Panel due on 11/21/2028  TETANUS VACCINE due on 12/12/2029     The 10-year CVD risk score (D'Agostino, et al., 2008) is: 21.5%    Values used to calculate the score:      Age: 53 years      Sex: Male      Diabetic: No      Tobacco smoker: No      Systolic Blood Pressure: 160 mmHg      Is BP treated: Yes      HDL Cholesterol: 44 mg/dL      Total Cholesterol: 178 mg/dL     We discussed the importance of controlling his blood pressure to significantly reduce his cardiac risk.        Office Visit on 11/21/2023   Component Date Value Ref Range Status    WBC 11/21/2023 5.8  3.8 - 10.8 Thousand/uL Final    RBC 11/21/2023 5.25  4.20 - 5.80 Million/uL Final    Hemoglobin 11/21/2023 16.1  13.2 - 17.1 g/dL Final    Hematocrit 11/21/2023 47.3  38.5 - 50.0 % Final    MCV 11/21/2023 90.1  80.0 - 100.0 fL Final    MCH 11/21/2023 30.7  27.0 - 33.0 pg Final    MCHC 11/21/2023 34.0  32.0 - 36.0 g/dL Final    RDW 11/21/2023 12.1  11.0 - 15.0 % Final     Platelets 11/21/2023 281  140 - 400 Thousand/uL Final    MPV 11/21/2023 10.1  7.5 - 12.5 fL Final    Neutrophils, Abs 11/21/2023 3,120  1,500 - 7,800 cells/uL Final    Lymph # 11/21/2023 1,618  850 - 3,900 cells/uL Final    Mono # 11/21/2023 476  200 - 950 cells/uL Final    Eos # 11/21/2023 534 (H)  15 - 500 cells/uL Final    Baso # 11/21/2023 52  0 - 200 cells/uL Final    Neutrophils Relative 11/21/2023 53.8  % Final    Lymph % 11/21/2023 27.9  % Final    Mono % 11/21/2023 8.2  % Final    Eosinophil % 11/21/2023 9.2  % Final    Basophil % 11/21/2023 0.9  % Final    Cholesterol 11/21/2023 178  <200 mg/dL Final    HDL 11/21/2023 44  > OR = 40 mg/dL Final    Triglycerides 11/21/2023 172 (H)  <150 mg/dL Final    LDL Cholesterol 11/21/2023 105 (H)  mg/dL (calc) Final    HDL/Cholesterol Ratio 11/21/2023 4.0  <5.0 (calc) Final    Non HDL Chol. (LDL+VLDL) 11/21/2023 134 (H)  <130 mg/dL (calc) Final    Magnesium 11/21/2023 2.3  1.5 - 2.5 mg/dL Final    Color, UA 11/21/2023 YELLOW  YELLOW Final    Appearance, UA 11/21/2023 CLEAR  CLEAR Final    Specific Gravity, UA 11/21/2023 1.018  1.001 - 1.035 Final    pH, UA 11/21/2023 6.5  5.0 - 8.0 Final    Glucose, UA 11/21/2023 NEGATIVE  NEGATIVE Final    Bilirubin, UA 11/21/2023 NEGATIVE  NEGATIVE Final    Ketones, UA 11/21/2023 NEGATIVE  NEGATIVE Final    Occult Blood UA 11/21/2023 NEGATIVE  NEGATIVE Final    Protein, UA 11/21/2023 NEGATIVE  NEGATIVE Final    Nitrite, UA 11/21/2023 NEGATIVE  NEGATIVE Final    Leukocytes, UA 11/21/2023 NEGATIVE  NEGATIVE Final    WBC Casts, UA 11/21/2023 NONE SEEN  < OR = 5 /HPF Final    RBC Casts, UA 11/21/2023 NONE SEEN  < OR = 2 /HPF Final    Squam Epithel, UA 11/21/2023 NONE SEEN  < OR = 5 /HPF Final    Bacteria, UA 11/21/2023 NONE SEEN  NONE SEEN /HPF Final    Hyaline Casts, UA 11/21/2023 NONE SEEN  NONE SEEN /LPF Final    Service Cmt: 11/21/2023    Final    Reflexive Urine Culture 11/21/2023    Final    Creatinine, Urine 11/21/2023 139  20 -  320 mg/dL Final    Microalb, Ur 11/21/2023 0.8  See Note: mg/dL Final    Microalb/Creat Ratio 11/21/2023 6  <30 mcg/mg creat Final    Hemoglobin A1C 11/21/2023 5.5  <5.7 % of total Hgb Final   Lab Visit on 11/20/2023   Component Date Value Ref Range Status    TSH 11/20/2023 2.179  0.400 - 4.000 uIU/mL Final    Free T4 11/20/2023 0.98  0.71 - 1.51 ng/dL Final    Sodium 11/20/2023 140  136 - 145 mmol/L Final    Potassium 11/20/2023 4.5  3.5 - 5.1 mmol/L Final    Chloride 11/20/2023 106  95 - 110 mmol/L Final    CO2 11/20/2023 25  23 - 29 mmol/L Final    Glucose 11/20/2023 107  70 - 110 mg/dL Final    BUN 11/20/2023 13  6 - 20 mg/dL Final    Creatinine 11/20/2023 1.2  0.5 - 1.4 mg/dL Final    Calcium 11/20/2023 9.7  8.7 - 10.5 mg/dL Final    Total Protein 11/20/2023 7.6  6.0 - 8.4 g/dL Final    Albumin 11/20/2023 4.5  3.5 - 5.2 g/dL Final    Total Bilirubin 11/20/2023 0.7  0.1 - 1.0 mg/dL Final    Alkaline Phosphatase 11/20/2023 87  55 - 135 U/L Final    AST 11/20/2023 26  10 - 40 U/L Final    ALT 11/20/2023 28  10 - 44 U/L Final    eGFR 11/20/2023 >60.0  >60 mL/min/1.73 m^2 Final    Anion Gap 11/20/2023 9  8 - 16 mmol/L Final   Lab Visit on 10/31/2023   Component Date Value Ref Range Status    TSH 10/31/2023 2.477  0.400 - 4.000 uIU/mL Final   Lab Visit on 09/18/2023   Component Date Value Ref Range Status    Sodium 09/18/2023 139  136 - 145 mmol/L Final    Potassium 09/18/2023 4.3  3.5 - 5.1 mmol/L Final    Chloride 09/18/2023 103  95 - 110 mmol/L Final    CO2 09/18/2023 24  23 - 29 mmol/L Final    Glucose 09/18/2023 99  70 - 110 mg/dL Final    BUN 09/18/2023 12  6 - 20 mg/dL Final    Creatinine 09/18/2023 1.2  0.5 - 1.4 mg/dL Final    Calcium 09/18/2023 9.7  8.7 - 10.5 mg/dL Final    Total Protein 09/18/2023 7.8  6.0 - 8.4 g/dL Final    Albumin 09/18/2023 4.5  3.5 - 5.2 g/dL Final    Total Bilirubin 09/18/2023 1.2 (H)  0.1 - 1.0 mg/dL Final    Alkaline Phosphatase 09/18/2023 97  55 - 135 U/L Final    AST 09/18/2023  28  10 - 40 U/L Final    ALT 09/18/2023 36  10 - 44 U/L Final    eGFR 09/18/2023 >60.0  >60 mL/min/1.73 m^2 Final    Anion Gap 09/18/2023 12  8 - 16 mmol/L Final    TSH 09/18/2023 6.154 (H)  0.400 - 4.000 uIU/mL Final    Free T4 09/18/2023 0.85  0.71 - 1.51 ng/dL Final   Lab Visit on 07/21/2023   Component Date Value Ref Range Status    Sodium 07/21/2023 140  136 - 145 mmol/L Final    Potassium 07/21/2023 3.8  3.5 - 5.1 mmol/L Final    Chloride 07/21/2023 105  95 - 110 mmol/L Final    CO2 07/21/2023 23  23 - 29 mmol/L Final    Glucose 07/21/2023 103  70 - 110 mg/dL Final    BUN 07/21/2023 15  6 - 20 mg/dL Final    Creatinine 07/21/2023 1.3  0.5 - 1.4 mg/dL Final    Calcium 07/21/2023 9.5  8.7 - 10.5 mg/dL Final    Total Protein 07/21/2023 7.5  6.0 - 8.4 g/dL Final    Albumin 07/21/2023 4.4  3.5 - 5.2 g/dL Final    Total Bilirubin 07/21/2023 1.0  0.1 - 1.0 mg/dL Final    Alkaline Phosphatase 07/21/2023 100  55 - 135 U/L Final    AST 07/21/2023 21  10 - 40 U/L Final    ALT 07/21/2023 24  10 - 44 U/L Final    eGFR 07/21/2023 >60.0  >60 mL/min/1.73 m^2 Final    Anion Gap 07/21/2023 12  8 - 16 mmol/L Final    TSH 07/21/2023 10.193 (H)  0.400 - 4.000 uIU/mL Final    Free T4 07/21/2023 0.80  0.71 - 1.51 ng/dL Final       Past Medical History:   Diagnosis Date    Hyperlipidemia     Hyperthyroidism      Social History     Socioeconomic History    Marital status:    Tobacco Use    Smoking status: Never    Smokeless tobacco: Never   Substance and Sexual Activity    Alcohol use: Not Currently     Comment: occasional    Drug use: Not Currently     Past Surgical History:   Procedure Laterality Date    COLONOSCOPY  2019    Dr. -RTC 5-10 years     History reviewed. No pertinent family history.    Review of patient's allergies indicates:  No Known Allergies    Current Outpatient Medications:     albuterol (PROVENTIL/VENTOLIN HFA) 90 mcg/actuation inhaler, Inhale 2 puffs into the lungs every 6 (six) hours as needed.  Rescue, Disp: 18 g, Rfl: 5    atorvastatin (LIPITOR) 40 MG tablet, Take 1 tablet (40 mg total) by mouth once daily., Disp: 90 tablet, Rfl: 3    krill oil 500 mg Cap, Take 1 capsule by mouth 2 (two) times daily., Disp: , Rfl:     hydrOXYzine HCL (ATARAX) 25 MG tablet, Take 1 tablet (25 mg total) by mouth nightly as needed (insomnia)., Disp: 30 tablet, Rfl: 5    losartan (COZAAR) 100 MG tablet, Take 1 tablet (100 mg total) by mouth once daily., Disp: 30 tablet, Rfl: 11    methIMAzole (TAPAZOLE) 10 MG Tab, Take 10 mg by mouth., Disp: , Rfl:     valACYclovir (VALTREX) 500 MG tablet, Take 1 tablet (500 mg total) by mouth 2 (two) times daily., Disp: 20 tablet, Rfl: 3    Review of Systems   Constitutional:  Negative for activity change, appetite change, chills, fatigue, fever and unexpected weight change.   HENT:  Negative for nasal congestion, ear pain, rhinorrhea, sore throat and trouble swallowing.    Eyes:  Negative for pain, discharge and visual disturbance.   Respiratory:  Negative for cough, chest tightness, shortness of breath and wheezing.    Cardiovascular:  Positive for palpitations. Negative for chest pain and leg swelling.   Gastrointestinal:  Negative for abdominal pain, blood in stool, constipation, diarrhea, nausea, vomiting and reflux.   Genitourinary:  Negative for bladder incontinence, dysuria, frequency, hematuria and urgency.   Musculoskeletal:  Negative for arthralgias, gait problem, joint swelling and myalgias.   Integumentary:  Negative for rash.   Neurological:  Negative for dizziness, tremors, weakness, light-headedness, numbness and headaches.   Hematological:  Does not bruise/bleed easily.   Psychiatric/Behavioral:  Positive for sleep disturbance. Negative for dysphoric mood and suicidal ideas. The patient is nervous/anxious.            Objective:      Vitals:    11/21/23 0830 11/21/23 0839   BP: (!) 158/88 (!) 154/82   Pulse: 66    SpO2: 98%    Weight: 92.4 kg (203 lb 12.8 oz)    Height: 6'  "0.44" (1.84 m)      Physical Exam  Vitals and nursing note reviewed.   Constitutional:       General: He is not in acute distress.     Appearance: Normal appearance. He is well-developed, well-groomed and normal weight. He is not ill-appearing.   HENT:      Head: Normocephalic and atraumatic.      Right Ear: External ear normal.      Left Ear: External ear normal.      Nose: Nose normal. No rhinorrhea.   Eyes:      General: No scleral icterus.     Pupils: Pupils are equal, round, and reactive to light.   Neck:      Thyroid: No thyromegaly.      Vascular: No carotid bruit.   Cardiovascular:      Rate and Rhythm: Normal rate and regular rhythm.      Pulses: Normal pulses.      Heart sounds: Normal heart sounds. No murmur heard.  Pulmonary:      Effort: Pulmonary effort is normal.      Breath sounds: Normal breath sounds. No wheezing or rales.   Abdominal:      General: There is no distension.      Palpations: Abdomen is soft.      Tenderness: There is no abdominal tenderness.   Musculoskeletal:         General: No tenderness or deformity. Normal range of motion.      Cervical back: Normal range of motion and neck supple.      Lumbar back: Normal. No spasms.      Right lower leg: No edema.      Left lower leg: No edema.   Skin:     General: Skin is warm and dry.      Capillary Refill: Capillary refill takes less than 2 seconds.      Coloration: Skin is not jaundiced.      Findings: No rash.   Neurological:      General: No focal deficit present.      Mental Status: He is alert and oriented to person, place, and time.      Cranial Nerves: No cranial nerve deficit.      Motor: No weakness.      Coordination: Coordination normal.      Gait: Gait normal.   Psychiatric:         Mood and Affect: Mood normal.         Behavior: Behavior normal. Behavior is cooperative.         Thought Content: Thought content normal.         Judgment: Judgment normal.           Assessment:       1. Wellness examination    2. Palpitations    3. " Mixed hyperlipidemia    4. Essential hypertension    5. Graves disease    6. Screening for diabetes mellitus    7. HSV infection    8. Insomnia, unspecified type         Plan:       Wellness examination  Here for routine visit for refills, but once here did report some concerns regarding his blood pressure and anxiety    Palpitations  Requested patient get labs done. VU  -     Magnesium; Future; Expected date: 11/21/2023    Mixed hyperlipidemia  -     Lipid Panel; Future; Expected date: 11/21/2023    Essential hypertension  Increase losartan to 50mg daily. Return next week with bp log and home cuff for calibration  -     CBC Auto Differential; Future; Expected date: 11/21/2023  -     Urinalysis, Reflex to Urine Culture Urine, Clean Catch; Future; Expected date: 11/21/2023  -     Microalbumin/Creatinine Ratio, Urine; Future; Expected date: 11/21/2023    Graves disease  Continue tapazole    Screening for diabetes mellitus  -     Hemoglobin A1C; Future; Expected date: 11/21/2023    HSV infection  -     valACYclovir (VALTREX) 500 MG tablet; Take 1 tablet (500 mg total) by mouth 2 (two) times daily.  Dispense: 20 tablet; Refill: 3    Insomnia, unspecified type  -     hydrOXYzine HCL (ATARAX) 25 MG tablet; Take 1 tablet (25 mg total) by mouth nightly as needed (insomnia).  Dispense: 30 tablet; Refill: 5      Follow up in about 3 months (around 2/21/2024), or 3 mos with me or Dr Sanchez for htn, for Nurse BP Check: 1-2 weeks at pts convenience, HTN.        12/3/2023 Karishma Dee

## 2023-12-18 ENCOUNTER — PATIENT MESSAGE (OUTPATIENT)
Dept: FAMILY MEDICINE | Facility: CLINIC | Age: 53
End: 2023-12-18
Payer: COMMERCIAL

## 2023-12-18 DIAGNOSIS — I10 ESSENTIAL HYPERTENSION: Primary | ICD-10-CM

## 2023-12-20 ENCOUNTER — PATIENT MESSAGE (OUTPATIENT)
Dept: FAMILY MEDICINE | Facility: CLINIC | Age: 53
End: 2023-12-20
Payer: COMMERCIAL

## 2023-12-20 RX ORDER — HYDROCHLOROTHIAZIDE 25 MG/1
25 TABLET ORAL DAILY
Qty: 30 TABLET | Refills: 11 | Status: SHIPPED | OUTPATIENT
Start: 2023-12-20 | End: 2024-01-22 | Stop reason: SDUPTHER

## 2023-12-20 NOTE — TELEPHONE ENCOUNTER
Please let him know I sent hydrochlorothiazide, which is also a diuretic, so he should take it in the morning. It will not affect his electrolytes like some other diuretics can, but he should be sure to continue to drink lots of water.

## 2023-12-25 ENCOUNTER — PATIENT MESSAGE (OUTPATIENT)
Dept: FAMILY MEDICINE | Facility: CLINIC | Age: 53
End: 2023-12-25
Payer: COMMERCIAL

## 2023-12-28 ENCOUNTER — CLINICAL SUPPORT (OUTPATIENT)
Dept: FAMILY MEDICINE | Facility: CLINIC | Age: 53
End: 2023-12-28
Payer: COMMERCIAL

## 2023-12-28 VITALS — SYSTOLIC BLOOD PRESSURE: 138 MMHG | DIASTOLIC BLOOD PRESSURE: 78 MMHG

## 2023-12-28 DIAGNOSIS — I10 ESSENTIAL HYPERTENSION: Primary | ICD-10-CM

## 2023-12-28 RX ORDER — AMLODIPINE BESYLATE 5 MG/1
5 TABLET ORAL DAILY
Qty: 90 TABLET | Refills: 3 | Status: SHIPPED | OUTPATIENT
Start: 2023-12-28 | End: 2024-12-27

## 2023-12-28 NOTE — PROGRESS NOTES
Pt is here for a Bp check. Pt is taking Losartan 100 mg daily and HCTZ 25 mg daily. Per Karishma add amlodipine 5 mg daily. Pt verbalized understanding

## 2024-01-07 DIAGNOSIS — I10 ESSENTIAL HYPERTENSION: ICD-10-CM

## 2024-01-07 RX ORDER — HYDROCHLOROTHIAZIDE 25 MG/1
25 TABLET ORAL DAILY
Qty: 30 TABLET | Refills: 11 | Status: CANCELLED | OUTPATIENT
Start: 2024-01-07 | End: 2025-01-06

## 2024-01-19 DIAGNOSIS — E05.00 GRAVES DISEASE: ICD-10-CM

## 2024-01-19 RX ORDER — METHIMAZOLE 10 MG/1
10 TABLET ORAL
Qty: 90 TABLET | Refills: 1 | Status: SHIPPED | OUTPATIENT
Start: 2024-01-19

## 2024-01-22 DIAGNOSIS — I10 ESSENTIAL HYPERTENSION: ICD-10-CM

## 2024-01-23 RX ORDER — HYDROCHLOROTHIAZIDE 25 MG/1
25 TABLET ORAL DAILY
Qty: 30 TABLET | Refills: 11 | Status: SHIPPED | OUTPATIENT
Start: 2024-01-23 | End: 2024-05-28 | Stop reason: SDUPTHER

## 2024-01-29 ENCOUNTER — LAB VISIT (OUTPATIENT)
Dept: LAB | Facility: HOSPITAL | Age: 54
End: 2024-01-29
Attending: INTERNAL MEDICINE
Payer: COMMERCIAL

## 2024-01-29 DIAGNOSIS — E05.90 HYPERTHYROIDISM: ICD-10-CM

## 2024-01-29 LAB
ALBUMIN SERPL BCP-MCNC: 4.2 G/DL (ref 3.5–5.2)
ALP SERPL-CCNC: 78 U/L (ref 55–135)
ALT SERPL W/O P-5'-P-CCNC: 34 U/L (ref 10–44)
ANION GAP SERPL CALC-SCNC: 12 MMOL/L (ref 8–16)
AST SERPL-CCNC: 30 U/L (ref 10–40)
BILIRUB SERPL-MCNC: 0.9 MG/DL (ref 0.1–1)
BUN SERPL-MCNC: 19 MG/DL (ref 6–20)
CALCIUM SERPL-MCNC: 9.6 MG/DL (ref 8.7–10.5)
CHLORIDE SERPL-SCNC: 105 MMOL/L (ref 95–110)
CO2 SERPL-SCNC: 23 MMOL/L (ref 23–29)
CREAT SERPL-MCNC: 1 MG/DL (ref 0.5–1.4)
EST. GFR  (NO RACE VARIABLE): >60 ML/MIN/1.73 M^2
GLUCOSE SERPL-MCNC: 82 MG/DL (ref 70–110)
POTASSIUM SERPL-SCNC: 4.3 MMOL/L (ref 3.5–5.1)
PROT SERPL-MCNC: 7 G/DL (ref 6–8.4)
SODIUM SERPL-SCNC: 140 MMOL/L (ref 136–145)
T4 FREE SERPL-MCNC: 0.97 NG/DL (ref 0.71–1.51)
TSH SERPL DL<=0.005 MIU/L-ACNC: 2.1 UIU/ML (ref 0.4–4)

## 2024-01-29 PROCEDURE — 80053 COMPREHEN METABOLIC PANEL: CPT | Performed by: INTERNAL MEDICINE

## 2024-01-29 PROCEDURE — 84443 ASSAY THYROID STIM HORMONE: CPT | Performed by: INTERNAL MEDICINE

## 2024-01-29 PROCEDURE — 36415 COLL VENOUS BLD VENIPUNCTURE: CPT | Mod: PN | Performed by: INTERNAL MEDICINE

## 2024-01-29 PROCEDURE — 84439 ASSAY OF FREE THYROXINE: CPT | Performed by: INTERNAL MEDICINE

## 2024-03-05 ENCOUNTER — TELEPHONE (OUTPATIENT)
Dept: FAMILY MEDICINE | Facility: CLINIC | Age: 54
End: 2024-03-05
Payer: COMMERCIAL

## 2024-03-05 ENCOUNTER — PATIENT MESSAGE (OUTPATIENT)
Dept: FAMILY MEDICINE | Facility: CLINIC | Age: 54
End: 2024-03-05
Payer: COMMERCIAL

## 2024-03-05 DIAGNOSIS — M67.88 BILATERAL ACHILLES TENDONOSIS: Primary | ICD-10-CM

## 2024-03-05 NOTE — TELEPHONE ENCOUNTER
----- Message from Maritza Tello LPN sent at 3/5/2024  2:15 PM CST -----    ----- Message -----  From: Karishma Gutierrez  Sent: 3/5/2024   1:50 PM CST  To: [facility] Sanchez Staff    -1:11- mariama with [facility] is calling for orders for physical therapy for bilateral acheilus tendon   [phone] phone   [phone] fax

## 2024-04-21 ENCOUNTER — PATIENT MESSAGE (OUTPATIENT)
Dept: FAMILY MEDICINE | Facility: CLINIC | Age: 54
End: 2024-04-21
Payer: COMMERCIAL

## 2024-05-27 ENCOUNTER — PATIENT MESSAGE (OUTPATIENT)
Dept: FAMILY MEDICINE | Facility: CLINIC | Age: 54
End: 2024-05-27
Payer: COMMERCIAL

## 2024-05-27 DIAGNOSIS — I10 ESSENTIAL HYPERTENSION: ICD-10-CM

## 2024-05-28 RX ORDER — HYDROCHLOROTHIAZIDE 25 MG/1
25 TABLET ORAL DAILY
Qty: 90 TABLET | Refills: 0 | Status: SHIPPED | OUTPATIENT
Start: 2024-05-28 | End: 2025-05-28

## 2024-05-28 RX ORDER — LOSARTAN POTASSIUM 100 MG/1
100 TABLET ORAL DAILY
Qty: 90 TABLET | Refills: 0 | Status: SHIPPED | OUTPATIENT
Start: 2024-05-28 | End: 2025-05-28

## 2024-05-28 NOTE — TELEPHONE ENCOUNTER
Last office visit on 11/21/23 and no upcoming appointment. Per last appointment notes on 11/21/23 patient was due to follow up in about 3 months (around 2/21/2024) for htn. Refills still remaining of both medications for a 30 day supply.

## 2024-07-15 DIAGNOSIS — I10 ESSENTIAL HYPERTENSION: ICD-10-CM

## 2024-07-15 RX ORDER — HYDROCHLOROTHIAZIDE 25 MG/1
25 TABLET ORAL DAILY
Qty: 90 TABLET | Refills: 0 | Status: SHIPPED | OUTPATIENT
Start: 2024-07-15 | End: 2024-07-22 | Stop reason: SDUPTHER

## 2024-07-22 ENCOUNTER — OFFICE VISIT (OUTPATIENT)
Dept: FAMILY MEDICINE | Facility: CLINIC | Age: 54
End: 2024-07-22
Payer: COMMERCIAL

## 2024-07-22 VITALS
BODY MASS INDEX: 28.71 KG/M2 | DIASTOLIC BLOOD PRESSURE: 74 MMHG | HEART RATE: 64 BPM | HEIGHT: 72 IN | WEIGHT: 212 LBS | OXYGEN SATURATION: 98 % | SYSTOLIC BLOOD PRESSURE: 136 MMHG

## 2024-07-22 DIAGNOSIS — Z12.5 ENCOUNTER FOR SCREENING FOR MALIGNANT NEOPLASM OF PROSTATE: ICD-10-CM

## 2024-07-22 DIAGNOSIS — I10 ESSENTIAL HYPERTENSION: Primary | ICD-10-CM

## 2024-07-22 DIAGNOSIS — E78.2 MIXED HYPERLIPIDEMIA: ICD-10-CM

## 2024-07-22 DIAGNOSIS — E05.00 GRAVES DISEASE: ICD-10-CM

## 2024-07-22 PROCEDURE — 99214 OFFICE O/P EST MOD 30 MIN: CPT | Mod: ,,,

## 2024-07-22 PROCEDURE — 3078F DIAST BP <80 MM HG: CPT | Mod: CPTII,,,

## 2024-07-22 PROCEDURE — 1159F MED LIST DOCD IN RCRD: CPT | Mod: CPTII,,,

## 2024-07-22 PROCEDURE — 3008F BODY MASS INDEX DOCD: CPT | Mod: CPTII,,,

## 2024-07-22 PROCEDURE — 3066F NEPHROPATHY DOC TX: CPT | Mod: CPTII,,,

## 2024-07-22 PROCEDURE — 1160F RVW MEDS BY RX/DR IN RCRD: CPT | Mod: CPTII,,,

## 2024-07-22 PROCEDURE — 3075F SYST BP GE 130 - 139MM HG: CPT | Mod: CPTII,,,

## 2024-07-22 PROCEDURE — 4010F ACE/ARB THERAPY RXD/TAKEN: CPT | Mod: CPTII,,,

## 2024-07-22 PROCEDURE — 3061F NEG MICROALBUMINURIA REV: CPT | Mod: CPTII,,,

## 2024-07-22 RX ORDER — LOSARTAN POTASSIUM 100 MG/1
100 TABLET ORAL DAILY
Qty: 90 TABLET | Refills: 3 | Status: SHIPPED | OUTPATIENT
Start: 2024-08-01 | End: 2025-08-01

## 2024-07-22 RX ORDER — HYDROCHLOROTHIAZIDE 25 MG/1
25 TABLET ORAL DAILY
Qty: 90 TABLET | Refills: 3 | Status: SHIPPED | OUTPATIENT
Start: 2024-08-01 | End: 2025-08-01

## 2024-07-22 RX ORDER — LOSARTAN POTASSIUM 100 MG/1
100 TABLET ORAL DAILY
Qty: 90 TABLET | Refills: 3 | Status: SHIPPED | OUTPATIENT
Start: 2024-08-01 | End: 2024-07-22 | Stop reason: SDUPTHER

## 2024-07-22 RX ORDER — ATORVASTATIN CALCIUM 40 MG/1
40 TABLET, FILM COATED ORAL DAILY
Qty: 90 TABLET | Refills: 3 | Status: SHIPPED | OUTPATIENT
Start: 2024-08-01 | End: 2025-08-01

## 2024-07-22 NOTE — PROGRESS NOTES
SUBJECTIVE:    Patient ID: Dimitri Lott is a 54 y.o. male.    Chief Complaint: 4M HTN Check Up (-HepC/HIV Screenings Due/-Shingles/Covid Vaccinations Due/-Colonoscopy complete per pt, does not remember MD name)    54 year old established male presents today for check up.    Sleeping pretty good, playing pickleball and hurt his back but saw Chiro in [place] Dr [place] and getting better  No bowel or bladder complaints     Dr Bird Pena podiatry [place] for Achilles tendonitis         Office Visit on 07/22/2024   Component Date Value Ref Range Status    WBC 07/23/2024 6.0  3.8 - 10.8 Thousand/uL Final    RBC 07/23/2024 4.75  4.20 - 5.80 Million/uL Final    Hemoglobin 07/23/2024 14.8  13.2 - 17.1 g/dL Final    Hematocrit 07/23/2024 44.4  38.5 - 50.0 % Final    MCV 07/23/2024 93.5  80.0 - 100.0 fL Final    MCH 07/23/2024 31.2  27.0 - 33.0 pg Final    MCHC 07/23/2024 33.3  32.0 - 36.0 g/dL Final    RDW 07/23/2024 12.2  11.0 - 15.0 % Final    Platelets 07/23/2024 277  140 - 400 Thousand/uL Final    MPV 07/23/2024 9.8  7.5 - 12.5 fL Final    Neutrophils, Abs 07/23/2024 2,934  1,500 - 7,800 cells/uL Final    Lymph # 07/23/2024 1,986  850 - 3,900 cells/uL Final    Mono # 07/23/2024 450  200 - 950 cells/uL Final    Eos # 07/23/2024 570 (H)  15 - 500 cells/uL Final    Baso # 07/23/2024 60  0 - 200 cells/uL Final    Neutrophils Relative 07/23/2024 48.9  % Final    Lymph % 07/23/2024 33.1  % Final    Mono % 07/23/2024 7.5  % Final    Eosinophil % 07/23/2024 9.5  % Final    Basophil % 07/23/2024 1.0  % Final    Glucose 07/23/2024 84  65 - 99 mg/dL Final    BUN 07/23/2024 15  7 - 25 mg/dL Final    Creatinine 07/23/2024 1.10  0.70 - 1.30 mg/dL Final    eGFR 07/23/2024 79  > OR = 60 mL/min/1.73m2 Final    BUN/Creatinine Ratio 07/23/2024 SEE NOTE:  6 - 22 (calc) Final    Sodium 07/23/2024 141  135 - 146 mmol/L Final    Potassium 07/23/2024 4.7  3.5 - 5.3 mmol/L Final    Chloride 07/23/2024 104  98 - 110 mmol/L Final     CO2 07/23/2024 29  20 - 32 mmol/L Final    Calcium 07/23/2024 9.6  8.6 - 10.3 mg/dL Final    Total Protein 07/23/2024 7.4  6.1 - 8.1 g/dL Final    Albumin 07/23/2024 4.8  3.6 - 5.1 g/dL Final    Globulin, Total 07/23/2024 2.6  1.9 - 3.7 g/dL (calc) Final    Albumin/Globulin Ratio 07/23/2024 1.8  1.0 - 2.5 (calc) Final    Total Bilirubin 07/23/2024 0.6  0.2 - 1.2 mg/dL Final    Alkaline Phosphatase 07/23/2024 72  35 - 144 U/L Final    AST 07/23/2024 19  10 - 35 U/L Final    ALT 07/23/2024 29  9 - 46 U/L Final    Cholesterol 07/23/2024 195  <200 mg/dL Final    HDL 07/23/2024 42  > OR = 40 mg/dL Final    Triglycerides 07/23/2024 327 (H)  <150 mg/dL Final    LDL Cholesterol 07/23/2024 109 (H)  mg/dL (calc) Final    HDL/Cholesterol Ratio 07/23/2024 4.6  <5.0 (calc) Final    Non HDL Chol. (LDL+VLDL) 07/23/2024 153 (H)  <130 mg/dL (calc) Final    Creatinine, Urine 07/23/2024 156  20 - 320 mg/dL Final    Microalb, Ur 07/23/2024 0.4  See Note: mg/dL Final    Microalb/Creat Ratio 07/23/2024 3  <30 mg/g creat Final    TSH 07/23/2024 2.58  0.40 - 4.50 mIU/L Final    T4, Free 07/23/2024 1.4  0.8 - 1.8 ng/dL Final    T3, Free 07/23/2024 3.4  2.3 - 4.2 pg/mL Final    Thyroid Peroxidase Ab 07/23/2024 2  <9 IU/mL Final    PROSTATE SPECIFIC ANTIGEN, SCR - Q* 07/23/2024 0.41  < OR = 4.00 ng/mL Final   Lab Visit on 01/29/2024   Component Date Value Ref Range Status    Sodium 01/29/2024 140  136 - 145 mmol/L Final    Potassium 01/29/2024 4.3  3.5 - 5.1 mmol/L Final    Chloride 01/29/2024 105  95 - 110 mmol/L Final    CO2 01/29/2024 23  23 - 29 mmol/L Final    Glucose 01/29/2024 82  70 - 110 mg/dL Final    BUN 01/29/2024 19  6 - 20 mg/dL Final    Creatinine 01/29/2024 1.0  0.5 - 1.4 mg/dL Final    Calcium 01/29/2024 9.6  8.7 - 10.5 mg/dL Final    Total Protein 01/29/2024 7.0  6.0 - 8.4 g/dL Final    Albumin 01/29/2024 4.2  3.5 - 5.2 g/dL Final    Total Bilirubin 01/29/2024 0.9  0.1 - 1.0 mg/dL Final    Alkaline Phosphatase 01/29/2024 78   55 - 135 U/L Final    AST 01/29/2024 30  10 - 40 U/L Final    ALT 01/29/2024 34  10 - 44 U/L Final    eGFR 01/29/2024 >60.0  >60 mL/min/1.73 m^2 Final    Anion Gap 01/29/2024 12  8 - 16 mmol/L Final    TSH 01/29/2024 2.096  0.400 - 4.000 uIU/mL Final    Free T4 01/29/2024 0.97  0.71 - 1.51 ng/dL Final       Past Medical History:   Diagnosis Date    Hyperlipidemia     Hyperthyroidism      Social History     Socioeconomic History    Marital status:    Tobacco Use    Smoking status: Never    Smokeless tobacco: Never   Substance and Sexual Activity    Alcohol use: Not Currently     Comment: occasional    Drug use: Not Currently     Social Determinants of Health     Financial Resource Strain: Low Risk  (7/17/2024)    Overall Financial Resource Strain (CARDIA)     Difficulty of Paying Living Expenses: Not hard at all   Food Insecurity: No Food Insecurity (7/17/2024)    Hunger Vital Sign     Worried About Running Out of Food in the Last Year: Never true     Ran Out of Food in the Last Year: Never true   Physical Activity: Sufficiently Active (7/17/2024)    Exercise Vital Sign     Days of Exercise per Week: 3 days     Minutes of Exercise per Session: 60 min   Stress: No Stress Concern Present (7/17/2024)    [facility] - Occupational Stress Questionnaire     Feeling of Stress : Not at all   Housing Stability: Unknown (7/17/2024)    Housing Stability Vital Sign     Unable to Pay for Housing in the Last Year: No     Past Surgical History:   Procedure Laterality Date    COLONOSCOPY  2019    Dr. -RTC 5-10 years     No family history on file.    Review of patient's allergies indicates:  No Known Allergies    Current Outpatient Medications:     methIMAzole (TAPAZOLE) 10 MG Tab, TAKE 1 TABLET BY MOUTH EVERY DAY (Patient not taking: Reported on 7/22/2024), Disp: 90 tablet, Rfl: 1    [START ON 8/1/2024] atorvastatin (LIPITOR) 40 MG tablet, Take 1 tablet (40 mg total) by mouth once daily.,  Disp: 90 tablet, Rfl: 3    [START ON 8/1/2024] hydroCHLOROthiazide (HYDRODIURIL) 25 MG tablet, Take 1 tablet (25 mg total) by mouth once daily., Disp: 90 tablet, Rfl: 3    krill oil 500 mg Cap, Take 1 capsule by mouth 2 (two) times daily. (Patient not taking: Reported on 7/22/2024), Disp: , Rfl:     [START ON 8/1/2024] losartan (COZAAR) 100 MG tablet, Take 1 tablet (100 mg total) by mouth once daily., Disp: 90 tablet, Rfl: 3    valACYclovir (VALTREX) 500 MG tablet, Take 1 tablet (500 mg total) by mouth 2 (two) times daily. (Patient not taking: Reported on 7/22/2024), Disp: 20 tablet, Rfl: 3    Review of Systems   Constitutional:  Negative for activity change and unexpected weight change.   HENT:  Negative for hearing loss, rhinorrhea and trouble swallowing.    Eyes:  Negative for discharge and visual disturbance.   Respiratory:  Negative for chest tightness and wheezing.    Cardiovascular:  Negative for chest pain and palpitations.   Gastrointestinal:  Negative for blood in stool, constipation, diarrhea and vomiting.   Endocrine: Negative for polydipsia and polyuria.   Genitourinary:  Negative for difficulty urinating, hematuria and urgency.   Musculoskeletal:  Negative for arthralgias, joint swelling and neck pain.   Neurological:  Negative for weakness and headaches.   Psychiatric/Behavioral:  Negative for confusion and dysphoric mood.            Objective:      Vitals:    07/22/24 0943   BP: 136/74   Pulse: 64   SpO2: 98%   Weight: 96.2 kg (212 lb)   Height: 6' (1.829 m)     Physical Exam  Vitals and nursing note reviewed.   Constitutional:       General: He is not in acute distress.     Appearance: Normal appearance. He is well-developed, well-groomed and normal weight. He is not ill-appearing.   HENT:      Head: Normocephalic and atraumatic.      Right Ear: External ear normal.      Left Ear: External ear normal.      Nose: Nose normal. No rhinorrhea.   Eyes:      General: No scleral icterus.     Pupils: Pupils  are equal, round, and reactive to light.   Neck:      Thyroid: No thyromegaly.      Vascular: No carotid bruit.   Cardiovascular:      Rate and Rhythm: Normal rate and regular rhythm.      Heart sounds: Normal heart sounds. No murmur heard.  Pulmonary:      Effort: Pulmonary effort is normal.      Breath sounds: Normal breath sounds. No wheezing or rales.   Abdominal:      General: There is no distension.      Palpations: Abdomen is soft.      Tenderness: There is no abdominal tenderness.   Musculoskeletal:         General: No tenderness or deformity. Normal range of motion.      Cervical back: Normal range of motion and neck supple.      Lumbar back: Normal. No spasms.      Right lower leg: No edema.      Left lower leg: No edema.   Skin:     General: Skin is warm and dry.      Capillary Refill: Capillary refill takes less than 2 seconds.      Coloration: Skin is not jaundiced.      Findings: No rash.   Neurological:      General: No focal deficit present.      Mental Status: He is alert and oriented to person, place, and time.      Cranial Nerves: No cranial nerve deficit.      Motor: No weakness.      Coordination: Coordination normal.      Gait: Gait normal.   Psychiatric:         Mood and Affect: Mood normal.         Behavior: Behavior is cooperative.           Assessment:       1. Essential hypertension    2. Mixed hyperlipidemia    3. Graves disease    4. Mixed hyperlipidemia    5. Encounter for screening for malignant neoplasm of prostate         Plan:       Essential hypertension  Blood pressure goals discussed with patient.  Tolerating current medications.  BP controlled today.  Continue current management.  Labs for evaluation of health/monitoring of condition.   -     CBC Auto Differential; Future; Expected date: 07/22/2024  -     Comprehensive Metabolic Panel; Future; Expected date: 07/22/2024  -     Microalbumin/Creatinine Ratio, Urine; Future; Expected date: 07/22/2024  -     Discontinue: losartan  (COZAAR) 100 MG tablet; Take 1 tablet (100 mg total) by mouth once daily.  Dispense: 90 tablet; Refill: 3  -     hydroCHLOROthiazide (HYDRODIURIL) 25 MG tablet; Take 1 tablet (25 mg total) by mouth once daily.  Dispense: 90 tablet; Refill: 3  -     losartan (COZAAR) 100 MG tablet; Take 1 tablet (100 mg total) by mouth once daily.  Dispense: 90 tablet; Refill: 3    Mixed hyperlipidemia  Dyslipidemia:   - Controlled    - On statin per ACC recommendations, will continue. No untoward side effects reported.   - Monitor LDL yearly at minimum  Labs for evaluation of health/monitoring of condition.   -     CBC Auto Differential; Future; Expected date: 07/22/2024  -     Comprehensive Metabolic Panel; Future; Expected date: 07/22/2024  -     Lipid Panel; Future; Expected date: 07/22/2024  -     atorvastatin (LIPITOR) 40 MG tablet; Take 1 tablet (40 mg total) by mouth once daily.  Dispense: 90 tablet; Refill: 3    Graves disease  stable continue current meds. TSH, free T4 are within normal reference ranges and patient is asymptomatic. Continue to monitor.   Labs for evaluation of health/monitoring of condition.   -     CBC Auto Differential; Future; Expected date: 07/22/2024  -     Comprehensive Metabolic Panel; Future; Expected date: 07/22/2024  -     TSH; Future; Expected date: 07/22/2024  -     T4, Free; Future; Expected date: 07/22/2024  -     T3, Free; Future; Expected date: 07/22/2024  -     Thyroid Peroxidase Antibody; Future; Expected date: 07/22/2024    Encounter for screening for malignant neoplasm of prostate  Patient of appropriate population group due for screening test.   -     PSA, Screening; Future; Expected date: 07/22/2024      Follow up in about 6 months (around 1/22/2025) for HTN, HLD.        7/29/2024 Karishma Dee

## 2024-07-22 NOTE — PATIENT INSTRUCTIONS
"Your recent lab work showed an elevation in your cholesterol and/or triglycerides.   This may increase your risk for heart disease in the future. Initial treatment will consist of a reduced fat diet and exercise. Try and make most of your fat intake from monounsaturated fats and limit saturated fats. Keep in mind that just because a label says "no cholesterol" doesn't mean it's healthy. It could still be high in saturated fat so read those labels!  I would like to recheck your labs in 6-12 months. Should you have any further questions, we can discuss it at your next regular appointment or you can make an appointment sooner to review these labs if you prefer   "

## 2024-07-25 ENCOUNTER — TELEPHONE (OUTPATIENT)
Dept: FAMILY MEDICINE | Facility: CLINIC | Age: 54
End: 2024-07-25
Payer: COMMERCIAL

## 2024-07-25 LAB
ALBUMIN SERPL-MCNC: 4.8 G/DL (ref 3.6–5.1)
ALBUMIN/CREAT UR: 3 MG/G CREAT
ALBUMIN/GLOB SERPL: 1.8 (CALC) (ref 1–2.5)
ALP SERPL-CCNC: 72 U/L (ref 35–144)
ALT SERPL-CCNC: 29 U/L (ref 9–46)
AST SERPL-CCNC: 19 U/L (ref 10–35)
BASOPHILS # BLD AUTO: 60 CELLS/UL (ref 0–200)
BASOPHILS NFR BLD AUTO: 1 %
BILIRUB SERPL-MCNC: 0.6 MG/DL (ref 0.2–1.2)
BUN SERPL-MCNC: 15 MG/DL (ref 7–25)
BUN/CREAT SERPL: NORMAL (CALC) (ref 6–22)
CALCIUM SERPL-MCNC: 9.6 MG/DL (ref 8.6–10.3)
CHLORIDE SERPL-SCNC: 104 MMOL/L (ref 98–110)
CHOLEST SERPL-MCNC: 195 MG/DL
CHOLEST/HDLC SERPL: 4.6 (CALC)
CO2 SERPL-SCNC: 29 MMOL/L (ref 20–32)
CREAT SERPL-MCNC: 1.1 MG/DL (ref 0.7–1.3)
CREAT UR-MCNC: 156 MG/DL (ref 20–320)
EGFR: 79 ML/MIN/1.73M2
EOSINOPHIL # BLD AUTO: 570 CELLS/UL (ref 15–500)
EOSINOPHIL NFR BLD AUTO: 9.5 %
ERYTHROCYTE [DISTWIDTH] IN BLOOD BY AUTOMATED COUNT: 12.2 % (ref 11–15)
GLOBULIN SER CALC-MCNC: 2.6 G/DL (CALC) (ref 1.9–3.7)
GLUCOSE SERPL-MCNC: 84 MG/DL (ref 65–99)
HCT VFR BLD AUTO: 44.4 % (ref 38.5–50)
HDLC SERPL-MCNC: 42 MG/DL
HGB BLD-MCNC: 14.8 G/DL (ref 13.2–17.1)
LDLC SERPL CALC-MCNC: 109 MG/DL (CALC)
LYMPHOCYTES # BLD AUTO: 1986 CELLS/UL (ref 850–3900)
LYMPHOCYTES NFR BLD AUTO: 33.1 %
MCH RBC QN AUTO: 31.2 PG (ref 27–33)
MCHC RBC AUTO-ENTMCNC: 33.3 G/DL (ref 32–36)
MCV RBC AUTO: 93.5 FL (ref 80–100)
MICROALBUMIN UR-MCNC: 0.4 MG/DL
MONOCYTES # BLD AUTO: 450 CELLS/UL (ref 200–950)
MONOCYTES NFR BLD AUTO: 7.5 %
NEUTROPHILS # BLD AUTO: 2934 CELLS/UL (ref 1500–7800)
NEUTROPHILS NFR BLD AUTO: 48.9 %
NONHDLC SERPL-MCNC: 153 MG/DL (CALC)
PLATELET # BLD AUTO: 277 THOUSAND/UL (ref 140–400)
PMV BLD REES-ECKER: 9.8 FL (ref 7.5–12.5)
POTASSIUM SERPL-SCNC: 4.7 MMOL/L (ref 3.5–5.3)
PROT SERPL-MCNC: 7.4 G/DL (ref 6.1–8.1)
PSA SERPL-MCNC: 0.41 NG/ML
RBC # BLD AUTO: 4.75 MILLION/UL (ref 4.2–5.8)
SODIUM SERPL-SCNC: 141 MMOL/L (ref 135–146)
T3FREE SERPL-MCNC: 3.4 PG/ML (ref 2.3–4.2)
T4 FREE SERPL-MCNC: 1.4 NG/DL (ref 0.8–1.8)
THYROPEROXIDASE AB SERPL-ACNC: 2 IU/ML
TRIGL SERPL-MCNC: 327 MG/DL
TSH SERPL-ACNC: 2.58 MIU/L (ref 0.4–4.5)
WBC # BLD AUTO: 6 THOUSAND/UL (ref 3.8–10.8)

## 2024-07-25 NOTE — PROGRESS NOTES
Let him know labs look ok except triglycerides, which are elevated. Watch the saturated fats, carbs and alcohol.

## 2024-07-25 NOTE — TELEPHONE ENCOUNTER
----- Message from AMOR Magana sent at 7/25/2024  2:50 PM CDT -----  Let him know labs look ok except triglycerides, which are elevated. Watch the saturated fats, carbs and alcohol.

## 2024-07-26 ENCOUNTER — TELEPHONE (OUTPATIENT)
Dept: FAMILY MEDICINE | Facility: CLINIC | Age: 54
End: 2024-07-26
Payer: COMMERCIAL

## 2024-07-26 NOTE — TELEPHONE ENCOUNTER
Spoke with pt verbatim per Karishma. Pt voiced understanding.  Pt wanted it noted that before his labs he was only taking his Atorvastatin every other day because he was low on supply. Pt has since gotten a refill and has gone back to taking it everyday. MANAN

## 2024-07-29 PROBLEM — E78.2 MIXED HYPERLIPIDEMIA: Status: ACTIVE | Noted: 2019-01-10

## 2025-01-22 ENCOUNTER — PATIENT MESSAGE (OUTPATIENT)
Dept: FAMILY MEDICINE | Facility: CLINIC | Age: 55
End: 2025-01-22
Payer: COMMERCIAL

## 2025-01-22 DIAGNOSIS — R00.0 TACHYCARDIA: ICD-10-CM

## 2025-01-22 DIAGNOSIS — B00.9 HSV INFECTION: ICD-10-CM

## 2025-01-22 DIAGNOSIS — R07.89 CHEST PRESSURE: ICD-10-CM

## 2025-01-22 DIAGNOSIS — E05.00 GRAVES DISEASE: ICD-10-CM

## 2025-01-22 DIAGNOSIS — R00.2 PALPITATIONS: Primary | ICD-10-CM

## 2025-01-22 DIAGNOSIS — R94.31 ABNORMAL EKG: ICD-10-CM

## 2025-01-22 RX ORDER — VALACYCLOVIR HYDROCHLORIDE 500 MG/1
500 TABLET, FILM COATED ORAL 2 TIMES DAILY
Qty: 20 TABLET | Refills: 3 | Status: SHIPPED | OUTPATIENT
Start: 2025-01-22

## 2025-01-24 ENCOUNTER — HOSPITAL ENCOUNTER (OUTPATIENT)
Facility: HOSPITAL | Age: 55
Discharge: HOME | End: 2025-01-25
Attending: STUDENT IN AN ORGANIZED HEALTH CARE EDUCATION/TRAINING PROGRAM | Admitting: INTERNAL MEDICINE
Payer: COMMERCIAL

## 2025-01-24 ENCOUNTER — OFFICE VISIT (OUTPATIENT)
Dept: FAMILY MEDICINE | Facility: CLINIC | Age: 55
End: 2025-01-24
Payer: COMMERCIAL

## 2025-01-24 VITALS
BODY MASS INDEX: 28.71 KG/M2 | OXYGEN SATURATION: 98 % | HEIGHT: 72 IN | DIASTOLIC BLOOD PRESSURE: 68 MMHG | SYSTOLIC BLOOD PRESSURE: 130 MMHG | HEART RATE: 98 BPM | WEIGHT: 212 LBS

## 2025-01-24 DIAGNOSIS — E05.00 GRAVES DISEASE: ICD-10-CM

## 2025-01-24 DIAGNOSIS — R05.9 COUGH, UNSPECIFIED TYPE: ICD-10-CM

## 2025-01-24 DIAGNOSIS — I10 ESSENTIAL HYPERTENSION: ICD-10-CM

## 2025-01-24 DIAGNOSIS — R00.2 PALPITATIONS: ICD-10-CM

## 2025-01-24 DIAGNOSIS — R07.9 CHEST PAIN: ICD-10-CM

## 2025-01-24 DIAGNOSIS — E78.2 MIXED HYPERLIPIDEMIA: ICD-10-CM

## 2025-01-24 DIAGNOSIS — R07.2 SUBSTERNAL CHEST PAIN: ICD-10-CM

## 2025-01-24 DIAGNOSIS — R00.2 POUNDING HEARTBEAT: Primary | ICD-10-CM

## 2025-01-24 PROBLEM — R94.31 ABNORMAL EKG: Status: ACTIVE | Noted: 2025-01-24

## 2025-01-24 PROBLEM — R07.89 CHEST PRESSURE: Status: ACTIVE | Noted: 2025-01-24

## 2025-01-24 LAB
ALBUMIN SERPL BCP-MCNC: 5 G/DL (ref 3.5–5.2)
ALP SERPL-CCNC: 61 U/L (ref 55–135)
ALT SERPL W/O P-5'-P-CCNC: 24 U/L (ref 10–44)
ANION GAP SERPL CALC-SCNC: 9 MMOL/L (ref 8–16)
AST SERPL-CCNC: 22 U/L (ref 10–40)
BASOPHILS # BLD AUTO: 0.06 K/UL (ref 0–0.2)
BASOPHILS NFR BLD: 0.6 % (ref 0–1.9)
BILIRUB SERPL-MCNC: 0.7 MG/DL (ref 0.1–1)
BILIRUB UR QL STRIP: NEGATIVE
BNP SERPL-MCNC: 13 PG/ML (ref 0–99)
BUN SERPL-MCNC: 24 MG/DL (ref 6–20)
CALCIUM SERPL-MCNC: 10.2 MG/DL (ref 8.7–10.5)
CHLORIDE SERPL-SCNC: 103 MMOL/L (ref 95–110)
CLARITY UR: CLEAR
CO2 SERPL-SCNC: 25 MMOL/L (ref 23–29)
COLOR UR: YELLOW
CREAT SERPL-MCNC: 1.3 MG/DL (ref 0.5–1.4)
DIFFERENTIAL METHOD BLD: ABNORMAL
EKG 12-LEAD: ABNORMAL
EOSINOPHIL # BLD AUTO: 0.2 K/UL (ref 0–0.5)
EOSINOPHIL NFR BLD: 2.5 % (ref 0–8)
ERYTHROCYTE [DISTWIDTH] IN BLOOD BY AUTOMATED COUNT: 12.2 % (ref 11.5–14.5)
EST. GFR  (NO RACE VARIABLE): >60 ML/MIN/1.73 M^2
GLUCOSE SERPL-MCNC: 93 MG/DL (ref 70–110)
GLUCOSE UR QL STRIP: NEGATIVE
HCT VFR BLD AUTO: 42.7 % (ref 40–54)
HGB BLD-MCNC: 14.8 G/DL (ref 14–18)
HGB UR QL STRIP: NEGATIVE
IMM GRANULOCYTES # BLD AUTO: 0.03 K/UL (ref 0–0.04)
IMM GRANULOCYTES NFR BLD AUTO: 0.3 % (ref 0–0.5)
INFLUENZA A, MOLECULAR: NEGATIVE
INFLUENZA B, MOLECULAR: NEGATIVE
KETONES UR QL STRIP: NEGATIVE
LEUKOCYTE ESTERASE UR QL STRIP: NEGATIVE
LYMPHOCYTES # BLD AUTO: 1.7 K/UL (ref 1–4.8)
LYMPHOCYTES NFR BLD: 18.3 % (ref 18–48)
MAGNESIUM SERPL-MCNC: 1.9 MG/DL (ref 1.6–2.6)
MCH RBC QN AUTO: 30.5 PG (ref 27–31)
MCHC RBC AUTO-ENTMCNC: 34.7 G/DL (ref 32–36)
MCV RBC AUTO: 88 FL (ref 82–98)
MONOCYTES # BLD AUTO: 0.6 K/UL (ref 0.3–1)
MONOCYTES NFR BLD: 6.5 % (ref 4–15)
NEUTROPHILS # BLD AUTO: 6.8 K/UL (ref 1.8–7.7)
NEUTROPHILS NFR BLD: 71.8 % (ref 38–73)
NITRITE UR QL STRIP: NEGATIVE
NRBC BLD-RTO: 0 /100 WBC
PH UR STRIP: 6 [PH] (ref 5–8)
PLATELET # BLD AUTO: 298 K/UL (ref 150–450)
PMV BLD AUTO: 9.1 FL (ref 9.2–12.9)
POTASSIUM SERPL-SCNC: 3.6 MMOL/L (ref 3.5–5.1)
PR INTERVAL: ABNORMAL
PROT SERPL-MCNC: 7.9 G/DL (ref 6–8.4)
PROT UR QL STRIP: NEGATIVE
PRT AXES: ABNORMAL
QRS DURATION: ABNORMAL
QT/QTC: ABNORMAL
RBC # BLD AUTO: 4.85 M/UL (ref 4.6–6.2)
SODIUM SERPL-SCNC: 137 MMOL/L (ref 136–145)
SP GR UR STRIP: 1.01 (ref 1–1.03)
SPECIMEN SOURCE: NORMAL
TROPONIN I SERPL HS-MCNC: 23.7 PG/ML (ref 0–14.9)
TROPONIN I SERPL HS-MCNC: 26 PG/ML (ref 0–14.9)
TSH SERPL DL<=0.005 MIU/L-ACNC: 2.05 UIU/ML (ref 0.34–5.6)
URN SPEC COLLECT METH UR: NORMAL
UROBILINOGEN UR STRIP-ACNC: NEGATIVE EU/DL
VENTRICULAR RATE: 79
WBC # BLD AUTO: 9.44 K/UL (ref 3.9–12.7)

## 2025-01-24 PROCEDURE — 84443 ASSAY THYROID STIM HORMONE: CPT | Performed by: NURSE PRACTITIONER

## 2025-01-24 PROCEDURE — G0378 HOSPITAL OBSERVATION PER HR: HCPCS

## 2025-01-24 PROCEDURE — 86803 HEPATITIS C AB TEST: CPT | Performed by: EMERGENCY MEDICINE

## 2025-01-24 PROCEDURE — 87502 INFLUENZA DNA AMP PROBE: CPT | Performed by: STUDENT IN AN ORGANIZED HEALTH CARE EDUCATION/TRAINING PROGRAM

## 2025-01-24 PROCEDURE — 83880 ASSAY OF NATRIURETIC PEPTIDE: CPT | Performed by: NURSE PRACTITIONER

## 2025-01-24 PROCEDURE — 83735 ASSAY OF MAGNESIUM: CPT | Performed by: NURSE PRACTITIONER

## 2025-01-24 PROCEDURE — 1160F RVW MEDS BY RX/DR IN RCRD: CPT | Mod: CPTII,S$GLB,,

## 2025-01-24 PROCEDURE — 3008F BODY MASS INDEX DOCD: CPT | Mod: CPTII,S$GLB,,

## 2025-01-24 PROCEDURE — 87389 HIV-1 AG W/HIV-1&-2 AB AG IA: CPT | Performed by: EMERGENCY MEDICINE

## 2025-01-24 PROCEDURE — 3075F SYST BP GE 130 - 139MM HG: CPT | Mod: CPTII,S$GLB,,

## 2025-01-24 PROCEDURE — 85025 COMPLETE CBC W/AUTO DIFF WBC: CPT | Performed by: NURSE PRACTITIONER

## 2025-01-24 PROCEDURE — 84484 ASSAY OF TROPONIN QUANT: CPT | Mod: 91 | Performed by: INTERNAL MEDICINE

## 2025-01-24 PROCEDURE — 1159F MED LIST DOCD IN RCRD: CPT | Mod: CPTII,S$GLB,,

## 2025-01-24 PROCEDURE — 84484 ASSAY OF TROPONIN QUANT: CPT | Performed by: STUDENT IN AN ORGANIZED HEALTH CARE EDUCATION/TRAINING PROGRAM

## 2025-01-24 PROCEDURE — 93000 ELECTROCARDIOGRAM COMPLETE: CPT | Mod: S$GLB,,,

## 2025-01-24 PROCEDURE — 93010 ELECTROCARDIOGRAM REPORT: CPT | Mod: ,,, | Performed by: GENERAL PRACTICE

## 2025-01-24 PROCEDURE — 84484 ASSAY OF TROPONIN QUANT: CPT | Mod: 91 | Performed by: NURSE PRACTITIONER

## 2025-01-24 PROCEDURE — 36415 COLL VENOUS BLD VENIPUNCTURE: CPT | Performed by: INTERNAL MEDICINE

## 2025-01-24 PROCEDURE — 93005 ELECTROCARDIOGRAM TRACING: CPT | Performed by: GENERAL PRACTICE

## 2025-01-24 PROCEDURE — 81003 URINALYSIS AUTO W/O SCOPE: CPT | Performed by: NURSE PRACTITIONER

## 2025-01-24 PROCEDURE — 25000003 PHARM REV CODE 250: Performed by: STUDENT IN AN ORGANIZED HEALTH CARE EDUCATION/TRAINING PROGRAM

## 2025-01-24 PROCEDURE — 80053 COMPREHEN METABOLIC PANEL: CPT | Performed by: NURSE PRACTITIONER

## 2025-01-24 PROCEDURE — 3078F DIAST BP <80 MM HG: CPT | Mod: CPTII,S$GLB,,

## 2025-01-24 PROCEDURE — 99214 OFFICE O/P EST MOD 30 MIN: CPT | Mod: S$GLB,,,

## 2025-01-24 PROCEDURE — 99285 EMERGENCY DEPT VISIT HI MDM: CPT | Mod: 25

## 2025-01-24 RX ORDER — IBUPROFEN 200 MG
24 TABLET ORAL
Status: DISCONTINUED | OUTPATIENT
Start: 2025-01-24 | End: 2025-01-25 | Stop reason: HOSPADM

## 2025-01-24 RX ORDER — NITROGLYCERIN 0.4 MG/1
0.4 TABLET SUBLINGUAL EVERY 5 MIN PRN
Status: DISCONTINUED | OUTPATIENT
Start: 2025-01-24 | End: 2025-01-25 | Stop reason: HOSPADM

## 2025-01-24 RX ORDER — LANOLIN ALCOHOL/MO/W.PET/CERES
800 CREAM (GRAM) TOPICAL
Status: DISCONTINUED | OUTPATIENT
Start: 2025-01-24 | End: 2025-01-25 | Stop reason: HOSPADM

## 2025-01-24 RX ORDER — ATORVASTATIN CALCIUM 40 MG/1
40 TABLET, FILM COATED ORAL DAILY
Status: DISCONTINUED | OUTPATIENT
Start: 2025-01-24 | End: 2025-01-25 | Stop reason: HOSPADM

## 2025-01-24 RX ORDER — NAPROXEN SODIUM 220 MG/1
81 TABLET, FILM COATED ORAL DAILY
Status: DISCONTINUED | OUTPATIENT
Start: 2025-01-25 | End: 2025-01-25 | Stop reason: HOSPADM

## 2025-01-24 RX ORDER — ONDANSETRON HYDROCHLORIDE 2 MG/ML
4 INJECTION, SOLUTION INTRAVENOUS EVERY 8 HOURS PRN
Status: DISCONTINUED | OUTPATIENT
Start: 2025-01-24 | End: 2025-01-25 | Stop reason: HOSPADM

## 2025-01-24 RX ORDER — IBUPROFEN 200 MG
16 TABLET ORAL
Status: DISCONTINUED | OUTPATIENT
Start: 2025-01-24 | End: 2025-01-25 | Stop reason: HOSPADM

## 2025-01-24 RX ORDER — SODIUM CHLORIDE 0.9 % (FLUSH) 0.9 %
10 SYRINGE (ML) INJECTION EVERY 12 HOURS PRN
Status: DISCONTINUED | OUTPATIENT
Start: 2025-01-24 | End: 2025-01-25 | Stop reason: HOSPADM

## 2025-01-24 RX ORDER — ACETAMINOPHEN 650 MG/1
650 SUPPOSITORY RECTAL EVERY 4 HOURS PRN
Status: DISCONTINUED | OUTPATIENT
Start: 2025-01-24 | End: 2025-01-25 | Stop reason: HOSPADM

## 2025-01-24 RX ORDER — GLUCAGON 1 MG
1 KIT INJECTION
Status: DISCONTINUED | OUTPATIENT
Start: 2025-01-24 | End: 2025-01-25 | Stop reason: HOSPADM

## 2025-01-24 RX ORDER — METOPROLOL TARTRATE 25 MG/1
25 TABLET, FILM COATED ORAL 2 TIMES DAILY
Status: DISCONTINUED | OUTPATIENT
Start: 2025-01-24 | End: 2025-01-25 | Stop reason: HOSPADM

## 2025-01-24 RX ORDER — NALOXONE HCL 0.4 MG/ML
0.02 VIAL (ML) INJECTION
Status: DISCONTINUED | OUTPATIENT
Start: 2025-01-24 | End: 2025-01-25 | Stop reason: HOSPADM

## 2025-01-24 RX ORDER — ASPIRIN 325 MG
325 TABLET ORAL
Status: COMPLETED | OUTPATIENT
Start: 2025-01-24 | End: 2025-01-24

## 2025-01-24 RX ADMIN — ASPIRIN 325 MG ORAL TABLET 325 MG: 325 PILL ORAL at 05:01

## 2025-01-24 NOTE — FIRST PROVIDER EVALUATION
Emergency Department TeleTriage Encounter Note      CHIEF COMPLAINT    Chief Complaint   Patient presents with    Abnormal EKG (sent by PCP Karishma Dee NP)       VITAL SIGNS   Initial Vitals [01/24/25 1526]   BP Pulse Resp Temp SpO2   (!) 161/84 82 16 97.6 °F (36.4 °C) 98 %      MAP       --            ALLERGIES    Review of patient's allergies indicates:  No Known Allergies    PROVIDER TRIAGE NOTE  Verbal consent for the teletriage evaluation was given by the patient at the start of the evaluation.  All efforts will be made to maintain patient's privacy during the evaluation.      This is a teletriage evaluation of a 55 y.o. male presenting to the ED with c/o palpitations for several weeks.  Sent by by PCP for abnormal EKG. Limited physical exam via telehealth: The patient is awake, alert, answering questions appropriately and is not in respiratory distress.  As the Teletriage provider, I performed an initial assessment and ordered appropriate labs and imaging studies, if any, to facilitate the patient's care once placed in the ED. Once a room is available, care and a full evaluation will be completed by an alternate ED provider.  Any additional orders and the final disposition will be determined by that provider.  All imaging and labs will not be followed-up by the Teletriage Team, including myself.          ORDERS  Labs Reviewed   HEPATITIS C ANTIBODY   HIV 1 / 2 ANTIBODY   MAGNESIUM   CBC W/ AUTO DIFFERENTIAL   COMPREHENSIVE METABOLIC PANEL   B-TYPE NATRIURETIC PEPTIDE   TROPONIN I HIGH SENSITIVITY   TSH   URINALYSIS, REFLEX TO URINE CULTURE       ED Orders (720h ago, onward)      Start Ordered     Status Ordering Provider    01/24/25 1607 01/24/25 1606  Troponin I High Sensitivity  Once         Ordered MILLY LINDA    01/24/25 1607 01/24/25 1606  TSH  STAT         Ordered MILLY LINDA    01/24/25 1607 01/24/25 1606  Urinalysis, Reflex to Urine Culture Urine, Clean Catch  STAT         Ordered MADDI  MILLY SHAW    01/24/25 1606 01/24/25 1606  Magnesium  STAT         Ordered MILLY LINDA    01/24/25 1606 01/24/25 1606  Vital signs  Every 15 min         Ordered MILLY LINDA    01/24/25 1606 01/24/25 1606  Cardiac Monitoring - Adult  Continuous        Comments: Notify Physician If:    Ordered MILLY LINDA    01/24/25 1606 01/24/25 1606  Pulse Oximetry Continuous  Continuous         Ordered MILLY LINDA    01/24/25 1606 01/24/25 1606  Diet NPO  Diet effective now         Ordered MILLY LINDA    01/24/25 1606 01/24/25 1606  Saline lock IV  Once         Ordered MILLY LINDA    01/24/25 1606 01/24/25 1606  EKG 12-lead  Once        Comments: Do not perform if previously done during this visit/ triage    Ordered MILLY LINDA    01/24/25 1606 01/24/25 1606  CBC auto differential  STAT         Ordered MILLY LINDA    01/24/25 1606 01/24/25 1606  Comprehensive metabolic panel  STAT         Ordered MILLY LINDA    01/24/25 1606 01/24/25 1606  B-Type natriuretic peptide (BNP)  STAT         Ordered MILLY LINDA    01/24/25 1606 01/24/25 1606  X-Ray Chest PA And Lateral  1 time imaging         Ordered MILLY LINDA    01/24/25 1528 01/24/25 1527  Hepatitis C Antibody  STAT         Ordered CARINE OVALLE    01/24/25 1528 01/24/25 1527  HIV 1/2 Ag/Ab (4th Gen)  STAT         Ordered CARINE OVLALE              Virtual Visit Note: The provider triage portion of this emergency department evaluation and documentation was performed via [employer], a HIPAA-compliant telemedicine application, in concert with a tele-presenter in the room. A face to face patient evaluation with one of my colleagues will occur once the patient is placed in an emergency department room.      DISCLAIMER: This note was prepared with [employer] voice recognition transcription software. Garbled syntax, mangled pronouns, and other bizarre constructions may be attributed to that software system.

## 2025-01-24 NOTE — PROGRESS NOTES
"  SUBJECTIVE:    Patient ID: Dimitri Lott is a 55 y.o. male.    Chief Complaint: Follow-up (No bottles//Pt here for follow up//discuss heart "pounding". Pt states at night it keeps him up and it is worse when he lays down//pt also c/o persistent chronic cough. Pt states it has been more raspy and happening more often than usual//declines colo and flu vacc at this time//JL)    55 year old male presents today after calling 2 days ago to report his heart has been "pounding" lately and he wanted to get labs done. I reviewed his chart at that time, noting history of Graves disease and visit notes with endocrinology. Patient was put on Tapazole with normalization of his labs and resolution of his symptoms. He has since stopped taking this medication. Was not on Tapazole when he completed blood work for me back in July, and thyroid studies were within normal limits. Patient has history of "self medicating," and by this I mean he will "wean" himself off prescription medication when he gets concerns about potential side effects that he may hear about or read about, or if he feels like his condition has improved that he is taking the medication for, while admitting he understands that the reason his condition may have improved is due to him being on the medication. He is usually a very easy-going, relaxed gentleman, but today he does seem quite concerned about his recent symptoms. He denies tucker chest pain, but does state he has had episodes where he feels like his "xyphoid" process is just pressing into something, or like he is getting a jolt of pain here (and points to substernal region). Also reports feeling like his "cough" is worsening, like he has a constant tickle in his throat. This initially led me to think he may be experiencing PVC's and I ordered an EKG. He had labs drawn this morning, including a magnesium level, which I did instruct may reveal some electrolyte abnormalities that could be a cause of his " "symptoms. I will not get these results until at the earliest, tomorrow. Patient, his wife and I agreed that if his labs do not reveal any abnormalities, further testing would be warranted, including perhaps prolonged cardiac monitoring on an outpatient basis. However, EKG revealed possible ischemia with inverted T waves,  a BBB. This coupled with his complaints, the bounding heart sounds on exam with slight pulmonic regurg, I feel it is best for him to present to ER asap. Refuses EMT transportation and wife will drive him straight to [facility]. Explained to patient this is out of an abundance of caution and that I will call and speak to a provider on staff there to let them know he is coming. When he arrives, I want him to let them know he was here, and had an abnormal EKG. He v/u and was able to ambulate without SOB, though he did say after just walking to the bathroom and back to the exam room that his heart felt again like it is "pounding out of his chest."    Follow-up      History of Present Illness    CHIEF COMPLAINT:  Dimitri presents today for heart pounding.    CARDIOVASCULAR:  He reports persistent heart pounding symptoms throughout the day that worsen when lying down, affecting sleep. He denies racing heart but describes the sensation as his heart "working hard" and "pumping hard." He experiences occasional abnormal beats, described as brief irregularities rather than significant jolts. During pickleball, after two games his heart was "beating excessively," requiring him to slow down, after which symptoms improved. He denies chest pain. A cardiac workup approximately two years ago, including stress test, calcium scoring, and imaging studies, was reportedly normal. He reports seeing a Dr. Santiago.     CHRONIC COUGH:  He reports worsening dry, unproductive cough that fluctuates throughout the year but has been particularly severe recently. He denies nasal congestion or post-nasal drip. He describes " a throat sensation triggering cough despite no visible irritant. Previous treatment for reflux was unsuccessful. He denies reflux symptoms including acid indigestion, burning sensation, regurgitation, sour taste, or nocturnal gagging.    MEDICATIONS:  He recently discontinued  (methimazole) after endocrinologist consultation. He is weaning off Lipitor, having reduced from 40 mg to 20 mg daily for two weeks, and has been completely off for approximately two weeks. He recently restarted hydrochlorothiazide two days ago. He is only taking half of his 100mg Losartan    MEDICAL HISTORY:  He has a history of thyroid issues (Graves disease), which have resolved while on tapazole with thyroid levels returning to normal.      ROS:  General: -fever, -chills, -fatigue, -weight gain, -weight loss  Eyes: -vision changes, -redness, -discharge  ENT: -ear pain, -nasal congestion, -sore throat  Cardiovascular: -chest pain, +palpitations, -lower extremity edema  Respiratory: +cough, -shortness of breath  Gastrointestinal: -abdominal pain, -nausea, -vomiting, -diarrhea, -constipation, -blood in stool, -heartburn  Genitourinary: -dysuria, -hematuria, -frequency  Musculoskeletal: -joint pain, -muscle pain  Skin: -rash, -lesion  Neurological: -headache, -dizziness, -numbness, -tingling  Psychiatric: -anxiety, -depression, -sleep difficulty         Admission on 01/24/2025   Component Date Value Ref Range Status    QRS Duration 01/24/2025 122  ms In process    OHS QTC Calculation 01/24/2025 437  ms In process   Office Visit on 01/24/2025   Component Date Value Ref Range Status    EKG 12-Lead 01/24/2025    Corrected    Ventricular Rate 01/24/2025 79   Corrected    AK Interval 01/24/2025 138ms   Corrected    QRS Duration 01/24/2025 130ms   Corrected    QT/QTc 01/24/2025 408/467ms   Corrected    PRT Axes 01/24/2025 18/ -8/ -30   Corrected       Past Medical History:   Diagnosis Date    Hyperlipidemia     Hyperthyroidism      Social History      Socioeconomic History    Marital status:    Tobacco Use    Smoking status: Never    Smokeless tobacco: Never   Substance and Sexual Activity    Alcohol use: Not Currently     Comment: occasional    Drug use: Not Currently     Social Drivers of Health     Financial Resource Strain: Low Risk  (7/17/2024)    Overall Financial Resource Strain (CARDIA)     Difficulty of Paying Living Expenses: Not hard at all   Food Insecurity: No Food Insecurity (7/17/2024)    Hunger Vital Sign     Worried About Running Out of Food in the Last Year: Never true     Ran Out of Food in the Last Year: Never true   Physical Activity: Sufficiently Active (7/17/2024)    Exercise Vital Sign     Days of Exercise per Week: 3 days     Minutes of Exercise per Session: 60 min   Stress: No Stress Concern Present (7/17/2024)    [employer] [place] [employer] - Occupational Stress Questionnaire     Feeling of Stress : Not at all   Housing Stability: Unknown (7/17/2024)    Housing Stability Vital Sign     Unable to Pay for Housing in the Last Year: No     Past Surgical History:   Procedure Laterality Date    COLONOSCOPY  2019    Dr. -RTC 5-10 years     No family history on file.    The 10-year CVD risk score (D'Agostino, et al., 2008) is: 27.1%    Values used to calculate the score:      Age: 55 years      Sex: Male      Diabetic: No      Tobacco smoker: No      Systolic Blood Pressure: 161 mmHg      Is BP treated: Yes      HDL Cholesterol: 42 mg/dL      Total Cholesterol: 195 mg/dL    Tests to Keep You Healthy    Colon Cancer Screening: DUE  Last Blood Pressure <= 139/89 (1/24/2025): Yes      Review of patient's allergies indicates:  No Known Allergies    Current Outpatient Medications:     atorvastatin (LIPITOR) 40 MG tablet, Take 1 tablet (40 mg total) by mouth once daily., Disp: 90 tablet, Rfl: 3    krill oil 500 mg Cap, Take 1 capsule by mouth 2 (two) times daily., Disp: , Rfl:     losartan (COZAAR) 100 MG tablet, Take 1  "tablet (100 mg total) by mouth once daily., Disp: 90 tablet, Rfl: 3    methIMAzole (TAPAZOLE) 10 MG Tab, TAKE 1 TABLET BY MOUTH EVERY DAY (Patient not taking: Reported on 1/24/2025), Disp: 90 tablet, Rfl: 1    valACYclovir (VALTREX) 500 MG tablet, Take 1 tablet (500 mg total) by mouth 2 (two) times daily., Disp: 20 tablet, Rfl: 3    hydroCHLOROthiazide (HYDRODIURIL) 25 MG tablet, Take 1 tablet (25 mg total) by mouth once daily. (Patient not taking: Reported on 1/24/2025), Disp: 90 tablet, Rfl: 3    Review of Systems        Objective:      Vitals:    01/24/25 1422   BP: 130/68   Pulse: 98   SpO2: 98%   Weight: 96.2 kg (212 lb)   Height: 6' (1.829 m)     Physical Exam  Constitutional:       Appearance: He is ill-appearing.      Comments: Typically calm and collected demeanor, today appears distressed over concerns about symptoms, just appears "off" from his usual presentation.    HENT:      Head: Normocephalic and atraumatic.   Eyes:      General: No scleral icterus.  Cardiovascular:      Rate and Rhythm: Regular rhythm.      Heart sounds: Murmur heard.      Comments: Rate of 98 technically WNL, bounding heart sounds with slight regurg noted at pulmonic location, no radiation   Pulmonary:      Effort: Pulmonary effort is normal.      Breath sounds: Normal breath sounds.   Musculoskeletal:      Right lower leg: No edema.      Left lower leg: No edema.   Skin:     Coloration: Skin is pale.   Neurological:      Mental Status: He is alert. Mental status is at baseline.       Physical Exam            Assessment:       1. Pounding heartbeat    2. Substernal chest pain    3. Essential hypertension    4. Graves disease    5. Mixed hyperlipidemia    6. Cough, unspecified type         Plan:               Assessment & Plan    IMPRESSION:  - Considering thyroid dysfunction as potential cause of patient's palpitations and cough  - Evaluating for potential electrolyte imbalances (magnesium, potassium) as cause of symptoms  - " Considering cardiac monitor for extended rhythm evaluation if labs unremarkable  - Assessing need for medication adjustments, particularly beta-blockers for rate/rhythm control  - EKG performed to rule out acute cardiac events and evaluate rhythm    Pounding heartbeat  Substernal chest pain  -     POCT EKG 12-LEAD (Manually Resulted by Ordering Provider)  HEART PALPITATIONS:  - Noted patient's reports of heart pounding all day, especially when lying down, with a jolt-like feeling or abnormal beat in the upper chest area.  - Auscultated the heart for one minute, observing normal rhythm but forceful contractions with an elevated heart rate of 98 bpm.  - considered propranolol for symptom relief pending lab results.  - Considered ordering a cardiac monitor for 1 week if lab results are inconclusive.  - Performed EKG in office.  - Noted patient's reports of feeling a jolt-like sensation or abnormal heartbeat.  - Auscultated heart, observing normal rhythm but forceful contractions.    Essential hypertension  HYPERTENSION:  - Discussed long-term risks of chronic hypertension, including end-organ damage.  - Educated the patient on new blood pressure targets (< 130/80) to prevent long-term complications.  - Recommend taking blood pressure medication at bedtime if experiencing side effects.  - Considered discontinuing hydrochlorothiazide due to frequent urination.  - Noted patient's experimentation with reducing blood pressure medication dosage.  - Evaluated blood pressure, finding it elevated but not significantly.  - Considered adjusting medication regimen, possibly removing hydrochlorothiazide or changing to a different combination.    Graves disease  THYROID DISORDER:  - Ordered thyroid labs.  - Noted patient's discontinuation of methimazole as instructed by endocrinologist.  - Reviewed previous thyroid levels from July, which were within normal range.  - Considered using propranolol for thyroid and heart rate/rhythm  control if thyroid issue is confirmed.    COUGH:  - Noted patient's reports of worsening dry, unproductive cough.  - Observed absence of reflux symptoms.  - Considered possible causes including weather, allergies, and connection to palpitations.  - Acknowledged ineffectiveness of previous reflux medication for cough.    Mixed hyperlipidemia  HYPERLIPIDEMIA:  - Explained relationship between refined sugar intake and elevated triglycerides.  - Noted patient's recent discontinuation of atorvastatin over the past 2 weeks.  - Discussed potential causes of high triglycerides, including heredity and refined sugar consumption.    LABS:  - Ordered an EKG and labs, including magnesium, potassium, and thyroid levels.  - Additional notes: - Ordered iron and ferritin levels, magnesium level, and metabolic panel (sodium, potassium).    FOLLOW UP:  Due to EKG results and overall clinical picture, patient referred to ER for evaluation.         No follow-ups on file.        This note was generated with the assistance of ambient listening technology. Verbal consent was obtained by the patient and accompanying visitor(s) for the recording of patient appointment to facilitate this note. I attest to having reviewed and edited the generated note for accuracy, though some syntax or spelling errors may persist. Please contact the author of this note for any clarification.      1/24/2025 Karishma Dee

## 2025-01-24 NOTE — ED PROVIDER NOTES
Encounter Date: 1/24/2025       History     Chief Complaint   Patient presents with    Abnormal EKG (sent by PCP Karishma Dee NP)     HPI    Patient is a 55-year-old male with history of hyperthyroidism and hypertension presenting with sensation of a pounding chest while playing pickleball today.  Patient states that two years ago he was seen by Cardiology with a complete workup including coronary CT and stress test which was unremarkable.  Today patient was playing pickleball and noticed a pounding in his chest which is unusual for him.  Reports it has some discomfort.  Denies any current pain at this time.  Patient went to urgent care where he had an EKG drawn and was advised to come to the ER.  Patient reports being active without any limitations.  Denies any family history of cardiac disease.    Review of patient's allergies indicates:  No Known Allergies  Past Medical History:   Diagnosis Date    Hyperlipidemia     Hyperthyroidism      Past Surgical History:   Procedure Laterality Date    COLONOSCOPY  2019    Dr. -RTC 5-10 years     No family history on file.  Social History     Tobacco Use    Smoking status: Never    Smokeless tobacco: Never   Substance Use Topics    Alcohol use: Not Currently     Comment: occasional    Drug use: Not Currently     Review of Systems    As noted above    Physical Exam     Initial Vitals [01/24/25 1526]   BP Pulse Resp Temp SpO2   (!) 161/84 82 16 97.6 °F (36.4 °C) 98 %      MAP       --         Physical Exam    Constitutional: He appears well-developed and well-nourished. He is not diaphoretic. No distress.   HENT:   Head: Normocephalic and atraumatic.   Eyes: Conjunctivae are normal.   Neck: No thyromegaly present.   Normal range of motion.  Cardiovascular:  Normal rate.           No murmur heard.  Pulmonary/Chest: Breath sounds normal. No respiratory distress. He has no wheezes. He has no rhonchi. He has no rales.   Abdominal: Abdomen is soft. He exhibits no  distension. There is no abdominal tenderness. There is no rebound.   Musculoskeletal:         General: No tenderness or edema. Normal range of motion.      Cervical back: Normal range of motion.     Neurological: He is alert and oriented to person, place, and time.   Skin: Skin is warm and dry. Capillary refill takes less than 2 seconds. No rash noted.         ED Course   Procedures  Labs Reviewed   CBC W/ AUTO DIFFERENTIAL - Abnormal       Result Value    WBC 9.44      RBC 4.85      Hemoglobin 14.8      Hematocrit 42.7      MCV 88      MCH 30.5      MCHC 34.7      RDW 12.2      Platelets 298      MPV 9.1 (*)     Immature Granulocytes 0.3      Gran # (ANC) 6.8      Immature Grans (Abs) 0.03      Lymph # 1.7      Mono # 0.6      Eos # 0.2      Baso # 0.06      nRBC 0      Gran % 71.8      Lymph % 18.3      Mono % 6.5      Eosinophil % 2.5      Basophil % 0.6      Differential Method Automated     COMPREHENSIVE METABOLIC PANEL - Abnormal    Sodium 137      Potassium 3.6      Chloride 103      CO2 25      Glucose 93      BUN 24 (*)     Creatinine 1.3      Calcium 10.2      Total Protein 7.9      Albumin 5.0      Total Bilirubin 0.7      Alkaline Phosphatase 61      AST 22      ALT 24      eGFR >60.0      Anion Gap 9     TROPONIN I HIGH SENSITIVITY - Abnormal    Troponin I High Sensitivity 23.7 (*)    TROPONIN I HIGH SENSITIVITY - Abnormal    Troponin I High Sensitivity 26.0 (*)    MAGNESIUM    Magnesium 1.9     B-TYPE NATRIURETIC PEPTIDE    BNP 13     TSH    TSH 2.053     URINALYSIS, REFLEX TO URINE CULTURE    Specimen UA Urine, Clean Catch      Color, UA Yellow      Appearance, UA Clear      pH, UA 6.0      Specific Gravity, UA 1.015      Protein, UA Negative      Glucose, UA Negative      Ketones, UA Negative      Bilirubin (UA) Negative      Occult Blood UA Negative      Nitrite, UA Negative      Urobilinogen, UA Negative      Leukocytes, UA Negative      Narrative:     Specimen Source->Urine   INFLUENZA A AND B  ANTIGEN    Influenza A, Molecular Negative      Influenza B, Molecular Negative      Flu A & B Source Nasal swab      Narrative:     Specimen Source->Nasopharyngeal Swab   TROPONIN I HIGH SENSITIVITY   HEMOGLOBIN A1C   LIPID PANEL   HEPATITIS C ANTIBODY   HIV 1 / 2 ANTIBODY        ECG Results              EKG 12-lead (In process)        Collection Time Result Time QRS Duration [facility] QTC Calculation    01/24/25 15:23:24 01/24/25 16:30:45 122 437                     In process by Interface, Lab In [facility] (01/24/25 16:30:54)                   Narrative:    Test Reason : R00.2,    Vent. Rate :  85 BPM     Atrial Rate :  85 BPM     P-R Int : 146 ms          QRS Dur : 122 ms      QT Int : 368 ms       P-R-T Axes :  76  39  11 degrees    QTcB Int : 437 ms    Normal sinus rhythm  Biatrial enlargement  Right bundle branch block  T wave abnormality, consider lateral ischemia  Abnormal ECG  No previous ECGs available    Referred By: AAAREFERRAL SELF           Confirmed By:                                   Imaging Results              X-Ray Chest PA And Lateral (Final result)  Result time 01/24/25 16:55:03      Final result by Timmy Thurman MD (01/24/25 16:55:03)                   Impression:      Normal chest.      Electronically signed by: Timmy Thurman  Date:    01/24/2025  Time:    16:55               Narrative:    EXAMINATION:  XR CHEST PA AND LATERAL    CLINICAL HISTORY:  palpitations;    FINDINGS:  PA and lateral chest compared with 02/23/2023 shows normal cardiomediastinal silhouette.    Lungs are clear. Pulmonary vasculature is normal. No acute osseous abnormality.                                       Medications   atorvastatin tablet 40 mg (has no administration in time range)   sodium chloride 0.9% flush 10 mL (has no administration in time range)   naloxone 0.4 mg/mL injection 0.02 mg (has no administration in time range)   glucose chewable tablet 16 g (has no administration in time range)   glucose chewable tablet  24 g (has no administration in time range)   dextrose 50% injection 12.5 g (has no administration in time range)   dextrose 50% injection 25 g (has no administration in time range)   glucagon (human recombinant) injection 1 mg (has no administration in time range)   potassium bicarbonate disintegrating tablet 50 mEq (has no administration in time range)   magnesium oxide tablet 800 mg (has no administration in time range)   acetaminophen suppository 650 mg (has no administration in time range)   ondansetron injection 4 mg (has no administration in time range)   metoprolol tartrate (LOPRESSOR) tablet 25 mg (has no administration in time range)   aspirin chewable tablet 81 mg (has no administration in time range)   nitroGLYCERIN SL tablet 0.4 mg (has no administration in time range)   aspirin tablet 325 mg (325 mg Oral Given 1/24/25 1738)     Medical Decision Making  55-year-old male with history of hyperthyroidism presenting with heart palpitations and abnormal EKG.  Currently reports heavy heartbeat.  Vital signs are stable.  Exam overall unremarkable.  EKG interpreted by me with normal sinus rhythm, normal axis, right bundle-branch block, evidence of biatrial enlargement, ST depression with T-wave inversion in V3 to V5.  No prior EKG for comparison.  Troponin mildly elevated at 26.  Differential includes arrhythmia, ACS, some kind of infiltrative cardiomyopathy or structural cardiac abnormality.  CBC and CMP overall unremarkable.  We will admit patient for further workup and management.     Minh Jay MD  Emergency Medicine      Risk  OTC drugs.                                      Clinical Impression:  Final diagnoses:  [R00.2] Palpitations          ED Disposition Condition    Observation                 Minh Jay MD  01/24/25 0056

## 2025-01-25 ENCOUNTER — CLINICAL SUPPORT (OUTPATIENT)
Dept: CARDIOLOGY | Facility: HOSPITAL | Age: 55
End: 2025-01-25
Attending: INTERNAL MEDICINE
Payer: COMMERCIAL

## 2025-01-25 VITALS
RESPIRATION RATE: 16 BRPM | SYSTOLIC BLOOD PRESSURE: 156 MMHG | WEIGHT: 205.25 LBS | HEIGHT: 72 IN | OXYGEN SATURATION: 100 % | HEART RATE: 59 BPM | BODY MASS INDEX: 27.8 KG/M2 | TEMPERATURE: 98 F | DIASTOLIC BLOOD PRESSURE: 80 MMHG

## 2025-01-25 VITALS — WEIGHT: 205.25 LBS | BODY MASS INDEX: 27.8 KG/M2 | HEIGHT: 72 IN

## 2025-01-25 LAB
ALBUMIN SERPL-MCNC: 4.9 G/DL (ref 3.6–5.1)
ALBUMIN/GLOB SERPL: 1.6 (CALC) (ref 1–2.5)
ALP SERPL-CCNC: 70 U/L (ref 35–144)
ALT SERPL-CCNC: 25 U/L (ref 9–46)
AORTIC ROOT ANNULUS: 3.2 CM
AORTIC VALVE CUSP SEPERATION: 1.9 CM
APICAL FOUR CHAMBER EJECTION FRACTION: 61 %
APICAL TWO CHAMBER EJECTION FRACTION: 66 %
ASCENDING AORTA: 3.5 CM
AST SERPL-CCNC: 23 U/L (ref 10–35)
AV INDEX (PROSTH): 0.86
AV MEAN GRADIENT: 6 MMHG
AV PEAK GRADIENT: 10 MMHG
AV REGURGITATION PRESSURE HALF TIME: 440 MS
AV VALVE AREA BY VELOCITY RATIO: 2.8 CM²
AV VALVE AREA: 3 CM²
AV VELOCITY RATIO: 0.81
BASOPHILS # BLD AUTO: 40 CELLS/UL (ref 0–200)
BASOPHILS NFR BLD AUTO: 0.7 %
BILIRUB SERPL-MCNC: 0.9 MG/DL (ref 0.2–1.2)
BSA FOR ECHO PROCEDURE: 2.17 M2
BUN SERPL-MCNC: 20 MG/DL (ref 7–25)
BUN/CREAT SERPL: NORMAL (CALC) (ref 6–22)
CALCIUM SERPL-MCNC: 10.1 MG/DL (ref 8.6–10.3)
CHLORIDE SERPL-SCNC: 99 MMOL/L (ref 98–110)
CO2 SERPL-SCNC: 28 MMOL/L (ref 20–32)
CREAT SERPL-MCNC: 1.1 MG/DL (ref 0.7–1.3)
CV ECHO LV RWT: 0.52 CM
CV PHARM DOSE: 0.4 MG
CV STRESS BASE HR: 62 BPM
DIASTOLIC BLOOD PRESSURE: 107 MMHG
DOP CALC AO PEAK VEL: 1.6 M/S
DOP CALC AO VTI: 32.5 CM
DOP CALC LVOT AREA: 3.5 CM2
DOP CALC LVOT DIAMETER: 2.1 CM
DOP CALC LVOT PEAK VEL: 1.3 M/S
DOP CALC LVOT STROKE VOLUME: 96.9 CM3
DOP CALC MV VTI: 34.3 CM
DOP CALCLVOT PEAK VEL VTI: 28 CM
E WAVE DECELERATION TIME: 257 MSEC
E/A RATIO: 0.73
E/E' RATIO: 8 M/S
ECHO LV POSTERIOR WALL: 1.1 CM (ref 0.6–1.1)
EGFR: 78 ML/MIN/1.73M2
EOSINOPHIL # BLD AUTO: 445 CELLS/UL (ref 15–500)
EOSINOPHIL NFR BLD AUTO: 7.8 %
ERYTHROCYTE [DISTWIDTH] IN BLOOD BY AUTOMATED COUNT: 12.1 % (ref 11–15)
FERRITIN SERPL-MCNC: 171 NG/ML (ref 38–380)
FRACTIONAL SHORTENING: 38.1 % (ref 28–44)
GLOBULIN SER CALC-MCNC: 3 G/DL (CALC) (ref 1.9–3.7)
GLUCOSE SERPL-MCNC: 91 MG/DL (ref 65–99)
HCT VFR BLD AUTO: 47.3 % (ref 38.5–50)
HCV AB SERPL QL IA: NEGATIVE
HGB BLD-MCNC: 16 G/DL (ref 13.2–17.1)
HIV 1+2 AB+HIV1 P24 AG SERPL QL IA: NEGATIVE
INTERVENTRICULAR SEPTUM: 1.2 CM (ref 0.6–1.1)
IRON SATN MFR SERPL: 43 % (CALC) (ref 20–48)
IRON SERPL-MCNC: 167 MCG/DL (ref 50–180)
IVC DIAMETER: 1.5 CM
LEFT ATRIUM SIZE: 4 CM
LEFT INTERNAL DIMENSION IN SYSTOLE: 2.6 CM (ref 2.1–4)
LEFT VENTRICLE DIASTOLIC VOLUME INDEX: 36.55 ML/M2
LEFT VENTRICLE DIASTOLIC VOLUME: 78.58 ML
LEFT VENTRICLE END DIASTOLIC VOLUME APICAL 2 CHAMBER: 73.8 ML
LEFT VENTRICLE END DIASTOLIC VOLUME APICAL 4 CHAMBER: 101 ML
LEFT VENTRICLE MASS INDEX: 77.9 G/M2
LEFT VENTRICLE SYSTOLIC VOLUME INDEX: 11.4 ML/M2
LEFT VENTRICLE SYSTOLIC VOLUME: 24.61 ML
LEFT VENTRICULAR INTERNAL DIMENSION IN DIASTOLE: 4.2 CM (ref 3.5–6)
LEFT VENTRICULAR MASS: 167.4 G
LV LATERAL E/E' RATIO: 6.7 M/S
LV SEPTAL E/E' RATIO: 9.6 M/S
LVED V (TEICH): 78.58 ML
LVES V (TEICH): 24.61 ML
LVOT MG: 4 MMHG
LVOT MV: 0.86 CM/S
LYMPHOCYTES # BLD AUTO: 1915 CELLS/UL (ref 850–3900)
LYMPHOCYTES NFR BLD AUTO: 33.6 %
MAGNESIUM SERPL-MCNC: 2.1 MG/DL (ref 1.5–2.5)
MCH RBC QN AUTO: 30.4 PG (ref 27–33)
MCHC RBC AUTO-ENTMCNC: 33.8 G/DL (ref 32–36)
MCV RBC AUTO: 89.9 FL (ref 80–100)
MONOCYTES # BLD AUTO: 485 CELLS/UL (ref 200–950)
MONOCYTES NFR BLD AUTO: 8.5 %
MV MEAN GRADIENT: 2 MMHG
MV PEAK A VEL: 0.92 M/S
MV PEAK E VEL: 0.67 M/S
MV PEAK GRADIENT: 5 MMHG
MV STENOSIS PRESSURE HALF TIME: 51 MS
MV VALVE AREA BY CONTINUITY EQUATION: 2.83 CM2
MV VALVE AREA P 1/2 METHOD: 4.31 CM2
NEUTROPHILS # BLD AUTO: 2816 CELLS/UL (ref 1500–7800)
NEUTROPHILS NFR BLD AUTO: 49.4 %
OHS CV CPX 1 MINUTE RECOVERY HEART RATE: 97 BPM
OHS CV CPX 85 PERCENT MAX PREDICTED HEART RATE MALE: 139
OHS CV CPX MAX PREDICTED HEART RATE: 163
OHS CV CPX PATIENT IS FEMALE: 0
OHS CV CPX PATIENT IS MALE: 1
OHS CV CPX PEAK DIASTOLIC BLOOD PRESSURE: 94 MMHG
OHS CV CPX PEAK HEAR RATE: 99 BPM
OHS CV CPX PEAK RATE PRESSURE PRODUCT: NORMAL
OHS CV CPX PEAK SYSTOLIC BLOOD PRESSURE: 223 MMHG
OHS CV CPX PERCENT MAX PREDICTED HEART RATE ACHIEVED: 61
OHS CV CPX RATE PRESSURE PRODUCT PRESENTING: NORMAL
OHS CV RV/LV RATIO: 0.9 CM
OHS LV EJECTION FRACTION SIMPSONS BIPLANE MOD: 63 %
PISA AR MAX VEL: 3.87 M/S
PISA MRMAX VEL: 4.33 M/S
PISA TR MAX VEL: 2.2 M/S
PLATELET # BLD AUTO: 307 THOUSAND/UL (ref 140–400)
PMV BLD REES-ECKER: 10.1 FL (ref 7.5–12.5)
POTASSIUM SERPL-SCNC: 4.8 MMOL/L (ref 3.5–5.3)
PROT SERPL-MCNC: 7.9 G/DL (ref 6.1–8.1)
PV MV: 0.91 M/S
PV PEAK GRADIENT: 7 MMHG
PV PEAK VELOCITY: 1.33 M/S
RBC # BLD AUTO: 5.26 MILLION/UL (ref 4.2–5.8)
RIGHT VENTRICLE DIASTOLIC BASEL DIMENSION: 3.8 CM
RIGHT VENTRICULAR END-DIASTOLIC DIMENSION: 3.8 CM
RV TISSUE DOPPLER FREE WALL SYSTOLIC VELOCITY 1 (APICAL 4 CHAMBER VIEW): 12.8 CM/S
SINUS: 3.3 CM
SODIUM SERPL-SCNC: 138 MMOL/L (ref 135–146)
SYSTOLIC BLOOD PRESSURE: 167 MMHG
T3FREE SERPL-MCNC: 3.6 PG/ML (ref 2.3–4.2)
T4 FREE SERPL-MCNC: 1.3 NG/DL (ref 0.8–1.8)
TDI LATERAL: 0.1 M/S
TDI SEPTAL: 0.07 M/S
TDI: 0.09 M/S
TIBC SERPL-MCNC: 387 MCG/DL (CALC) (ref 250–425)
TR MAX PG: 19 MMHG
TRICUSPID ANNULAR PLANE SYSTOLIC EXCURSION: 2.14 CM
TROPONIN I SERPL HS-MCNC: 22.7 PG/ML (ref 0–14.9)
TSH SERPL-ACNC: 2.28 MIU/L (ref 0.4–4.5)
WBC # BLD AUTO: 5.7 THOUSAND/UL (ref 3.8–10.8)
Z-SCORE OF LEFT VENTRICULAR DIMENSION IN END DIASTOLE: -5.08
Z-SCORE OF LEFT VENTRICULAR DIMENSION IN END SYSTOLE: -3.89

## 2025-01-25 PROCEDURE — 93017 CV STRESS TEST TRACING ONLY: CPT

## 2025-01-25 PROCEDURE — 93016 CV STRESS TEST SUPVJ ONLY: CPT | Mod: ,,, | Performed by: INTERNAL MEDICINE

## 2025-01-25 PROCEDURE — 93306 TTE W/DOPPLER COMPLETE: CPT | Mod: 26,,, | Performed by: INTERNAL MEDICINE

## 2025-01-25 PROCEDURE — A9502 TC99M TETROFOSMIN: HCPCS

## 2025-01-25 PROCEDURE — 25000003 PHARM REV CODE 250: Performed by: INTERNAL MEDICINE

## 2025-01-25 PROCEDURE — G0378 HOSPITAL OBSERVATION PER HR: HCPCS

## 2025-01-25 PROCEDURE — 93306 TTE W/DOPPLER COMPLETE: CPT

## 2025-01-25 PROCEDURE — 93018 CV STRESS TEST I&R ONLY: CPT | Mod: ,,, | Performed by: INTERNAL MEDICINE

## 2025-01-25 PROCEDURE — 63600175 PHARM REV CODE 636 W HCPCS: Performed by: INTERNAL MEDICINE

## 2025-01-25 PROCEDURE — 25000003 PHARM REV CODE 250: Performed by: NURSE PRACTITIONER

## 2025-01-25 RX ORDER — LANOLIN ALCOHOL/MO/W.PET/CERES
400 CREAM (GRAM) TOPICAL NIGHTLY
Qty: 30 TABLET | Refills: 0 | Status: SHIPPED | OUTPATIENT
Start: 2025-01-25 | End: 2025-02-24

## 2025-01-25 RX ORDER — POTASSIUM CHLORIDE 750 MG/1
30 CAPSULE, EXTENDED RELEASE ORAL ONCE
Status: COMPLETED | OUTPATIENT
Start: 2025-01-25 | End: 2025-01-25

## 2025-01-25 RX ORDER — LOSARTAN POTASSIUM 50 MG/1
100 TABLET ORAL DAILY
Status: DISCONTINUED | OUTPATIENT
Start: 2025-01-25 | End: 2025-01-25 | Stop reason: HOSPADM

## 2025-01-25 RX ORDER — REGADENOSON 0.08 MG/ML
0.4 INJECTION, SOLUTION INTRAVENOUS
Status: COMPLETED | OUTPATIENT
Start: 2025-01-25 | End: 2025-01-25

## 2025-01-25 RX ORDER — METOPROLOL TARTRATE 25 MG/1
25 TABLET, FILM COATED ORAL 2 TIMES DAILY
Qty: 180 TABLET | Refills: 0 | Status: SHIPPED | OUTPATIENT
Start: 2025-01-25 | End: 2025-04-25

## 2025-01-25 RX ADMIN — TETROFOSMIN 10.1 MILLICURIE: 1.38 INJECTION, POWDER, LYOPHILIZED, FOR SOLUTION INTRAVENOUS at 10:01

## 2025-01-25 RX ADMIN — REGADENOSON 0.4 MG: 0.08 INJECTION, SOLUTION INTRAVENOUS at 10:01

## 2025-01-25 RX ADMIN — LOSARTAN POTASSIUM 100 MG: 50 TABLET, FILM COATED ORAL at 11:01

## 2025-01-25 RX ADMIN — ASPIRIN 81 MG: 81 TABLET, CHEWABLE ORAL at 11:01

## 2025-01-25 RX ADMIN — TETROFOSMIN 24.3 MILLICURIE: 1.38 INJECTION, POWDER, LYOPHILIZED, FOR SOLUTION INTRAVENOUS at 10:01

## 2025-01-25 RX ADMIN — METOPROLOL TARTRATE 25 MG: 25 TABLET, FILM COATED ORAL at 11:01

## 2025-01-25 RX ADMIN — POTASSIUM CHLORIDE 30 MEQ: 750 CAPSULE, EXTENDED RELEASE ORAL at 02:01

## 2025-01-25 NOTE — NURSING
Discharge instructions given to patient and spouse. Both verbalize understanding. All questions encouraged and answered. PIV removed, fully intact. Left floor safely with all belongings.

## 2025-01-25 NOTE — PLAN OF CARE
Critical access hospital  Initial Discharge Assessment       Primary Care Provider: Dionicio Sanchez MD    Admission Diagnosis: Palpitations [R00.2]    Admission Date: 1/24/2025  Expected Discharge Date: 1/25/2025    Discharge assessment completed with patient's significant other, Shanna, at bedside. Information verified as correct on facesheet. Patient independent in ADLs. No HH/HD/DME at home/coumadin. Discharge plan is home. Shanna to provide transport    Transition of Care Barriers: None    Payor: BLUE CROSS BLUE SHIELD / Plan: BC[employer] OF [employer] EPO / Product Type: Commercial /     Extended Emergency Contact Information  Primary Emergency Contact: Shanna Simpson  Address: [address] [place] [address]           [place] [address] [place]  Home Phone: [phone]  Mobile Phone: [phone]  Relation: Significant other    Discharge Plan A: Home with family  Discharge Plan B: Home      [id number] - [place] - [address]  [address]  [place] [address]  Phone: [phone] Fax: [phone]      Initial Assessment (most recent)       Adult Discharge Assessment - 01/25/25 1203          Discharge Assessment    Assessment Type Discharge Planning Assessment     Confirmed/corrected address, phone number and insurance Yes     Confirmed Demographics Correct on Facesheet     Source of Information family     Does patient/caregiver understand observation status Yes     Communicated MYNOR with patient/caregiver Yes     Reason For Admission chest pressure     People in Home significant other     Facility Arrived From: home     Do you expect to return to your current living situation? Yes     Do you have help at home or someone to help you manage your care at home? No     Who are your caregiver(s) and their phone number(s)? independent in care     Prior to hospitilization cognitive status: Alert/Oriented     Current cognitive status: Alert/Oriented     Walking or Climbing Stairs Difficulty no      Dressing/Bathing Difficulty no     Equipment Currently Used at Home blood pressure machine     Readmission within 30 days? No     Patient currently being followed by outpatient case management? No     Do you currently have service(s) that help you manage your care at home? No     Do you take prescription medications? Yes     Do you have prescription coverage? Yes     Coverage [employer] of [place]     Do you have any problems affording any of your prescribed medications? No     Is the patient taking medications as prescribed? yes     Who is going to help you get home at discharge? Shanna     How do you get to doctors appointments? car, drives self     Are you on dialysis? No     Do you take coumadin? No     Discharge Plan A Home with family     Discharge Plan B Home     DME Needed Upon Discharge  none     Discharge Plan discussed with: Spouse/sig other     Transition of Care Barriers None

## 2025-01-25 NOTE — HPI
55-year-old male with history of hyperthyroidism and hypertension presenting with sensation of a pounding chest while playing pickleball today.  Same event happened 2 years back and seen by Cardiology with a complete workup including coronary CT and stress test which was unremarkable.   Today he had chest discomfort with pounding chest and came to ER. He had hyperthyroidism and was on methimazole and his MD stopped and doing well and today TSH is normal .  EKG with T inversions in V3-4-5  RBBB  otherwise no STEMI.  No leg swelling , fever , SBO and  Denies any family history of cardiac disease.Labs are stable . No PSH

## 2025-01-25 NOTE — ASSESSMENT & PLAN NOTE
As per HPI  NPO after MN   Hold losartan and add lopressor   Continue rest meds  Lexiscan myoview tomorrow   Cardio Cx if needed   ECHO

## 2025-01-25 NOTE — PLAN OF CARE
Problem: Adult Inpatient Plan of Care  Goal: Plan of Care Review  Outcome: Progressing  Goal: Patient-Specific Goal (Individualized)  Outcome: Progressing  Goal: Absence of Hospital-Acquired Illness or Injury  Outcome: Progressing  Goal: Optimal Comfort and Wellbeing  Outcome: Progressing  Goal: Readiness for Transition of Care  Outcome: Progressing

## 2025-01-25 NOTE — DISCHARGE SUMMARY
"[facility]  Discharge Summary      Patient Name: Dimitri Lott  MRN: [id number]  DORON: [id number]  Patient Class: OP- Observation  Admission Date: 1/24/2025  Hospital Length of Stay: 0 days  Discharge Date and Time: 1/25/2025  3:40 PM  Attending Physician: No att. providers found   Discharging Provider: Carol Mathur NP  Primary Care Provider: Dionicio Sanchez MD    Primary Care Team: Networked reference to record PCT     HPI:   55-year-old male with history of hyperthyroidism and hypertension presenting with sensation of a pounding chest while playing pickleball today.  Same event happened 2 years back and seen by Cardiology with a complete workup including coronary CT and stress test which was unremarkable.   Today he had chest discomfort with pounding chest and came to ER. He had hyperthyroidism and was on methimazole and his MD stopped and doing well and today TSH is normal .  EKG with T inversions in V3-4-5  RBBB  otherwise no STEMI.  No leg swelling , fever , SBO and  Denies any family history of cardiac disease.Labs are stable . No PSH        * No surgery found *      Hospital Course:   Mr. Lott has been monitored closely during his hospitalization. He was admitted on 1/24/25 for chest pain while playing pickleball. Troponins were trended and flat at 23-->26-->22. EKG NSR RBBB T wave inversions in lateral leads with no prior EKGs for comparison. CXR with no acute findings. He had a NM stress test that was negative for reversible ischemia. Echo: EF 60-65% with normal diastolic function. His chest pain was described as a tightness and associated with palpitations. He had no arrhythmias noted on telemetry overnight, but did have infrequent PACs. He's had a cough for some time that has been worked up by pulm, ENT, and allergy/immunology and he states the only thing they decided was that he has a "sensitive vagus nerve". His BP was elevated for the duration of his " hospitalization with highest reading at 192/89 which improved to 156/80 when losartan was resumed. HTN urgency is likely contributing to the chest tightness he is experiencing. He is prescribed HCTZ, but doesn't take this. Recommend he resume taking HCTZ at discharge. He has a history of hyperthyroidism and was previously on methimazole. TSH/T3/Free T4 are WNL. He was started on metoprolol tartrate 25mg PO BID and will continue this outpt for palpitations along with Mag Ox 400mg PO qHS. Outpt referral to cardiology for evaluation and consideration for a Zio patch has been placed. He has a blood pressure cuff at home and recommend he monitor his blood pressure at home and keep a log and bring to his f/u appt with cardiology and PCP. He was seen and examined on the date of discharge. Strict return prxn provided.     Goals of Care Treatment Preferences:  Code Status: Full Code      SDOH Screening:  The patient was screened for utility difficulties, food insecurity, transport difficulties, housing insecurity, and interpersonal safety and there were no concerns identified this admission.     Consults:     * Chest pressure  Mild + trop  Trend  ASA , statin , beta blocker       Abnormal EKG  As per HPI  NPO after MN   Hold losartan and add lopressor   Continue rest meds  Lexiscan myoview tomorrow   Cardio Cx if needed   ECHO       Graves disease  Now off methimazole and doing well   TSH WNL        Essential hypertension  Patient's blood pressure range in the last 24 hours was: BP  Min: 130/68  Max: 161/84.The patient's inpatient anti-hypertensive regimen is listed below:  Current Antihypertensives  metoprolol tartrate (LOPRESSOR) tablet 25 mg, 2 times daily, Oral  nitroGLYCERIN SL tablet 0.4 mg, Every 5 min PRN, Sublingual    Plan  - BP is controlled, no changes needed to their regimen      Mixed hyperlipidemia  Statin       Palpitations  Tele monitor         Final Active Diagnoses:    Diagnosis Date Noted POA    PRINCIPAL  PROBLEM:  Chest pressure [R07.89] 01/24/2025 Unknown    Abnormal EKG [R94.31] 01/24/2025 Unknown    Essential hypertension [I10] 12/03/2023 Yes    Graves disease [E05.00] 12/03/2023 Yes    Palpitations [R00.2] 12/03/2023 Yes    Mixed hyperlipidemia [E78.2] 01/10/2019 Yes      Problems Resolved During this Admission:       Discharged Condition: stable    Disposition: Home or Self Care    Follow Up:   Follow-up Information       Dionicio Sanchez MD. Go on 2/6/2025.    Specialties: Family Medicine, Home Health Services, Hospice Services  Why: hospital follow up appointment scheduled at 11:40 AM  Contact information:  [address] [address]  [phone]                           Patient Instructions:      Ambulatory referral/consult to Cardiology   Standing Status: Future   Referral Priority: Routine Referral Type: Consultation   Referral Reason: Specialty Services Required   Requested Specialty: Cardiology   Number of Visits Requested: 1     Diet Adult Regular     Notify your health care provider if you experience any of the following:  temperature >100.4     Notify your health care provider if you experience any of the following:  persistent nausea and vomiting or diarrhea     Notify your health care provider if you experience any of the following:  severe uncontrolled pain     Notify your health care provider if you experience any of the following:  redness, tenderness, or signs of infection (pain, swelling, redness, odor or green/yellow discharge around incision site)     Notify your health care provider if you experience any of the following:  difficulty breathing or increased cough     Notify your health care provider if you experience any of the following:  persistent dizziness, light-headedness, or visual disturbances     Notify your health care provider if you experience any of the following:  increased confusion or weakness     Activity as tolerated       Significant Diagnostic  Studies: Labs: CMP   Recent Labs   Lab 01/24/25  0910 01/24/25  1632    137   K 4.8 3.6   CL 99 103   CO2 28 25   GLU 91 93   BUN 20 24*   CREATININE 1.10 1.3   CALCIUM 10.1 10.2   PROT 7.9 7.9   ALBUMIN 4.9 5.0   BILITOT 0.9 0.7   ALKPHOS  --  61   AST 23 22   ALT 25 24   ANIONGAP  --  9    and CBC   Recent Labs   Lab 01/24/25  0910 01/24/25  1632   WBC 5.7 9.44   HGB 16.0 14.8   HCT 47.3 42.7    298     Recent Labs   Lab 01/24/25  2338   TROPONINIHS 22.7*     NM Myocardial Perfusion Spect Multi Pharmacologic    Result Date: 1/25/2025  EXAMINATION: NM MYOCARDIAL PERFUSION SPECT MULTI PHARM CLINICAL HISTORY: Chest pain/anginal equiv, low CAD risk, not treadmill candidate; TECHNIQUE: Radiopharmaceutical: 10.1 mCi Technetium 99m Myoview utilized for rest imaging. 24.3 mCi Technetium 99m Myoview utilized for stress imaging. 0.4 mg Lexiscan administered for pharmacologic stress. COMPARISON: None FINDINGS: Tomographic images reveal uniform radiopharmaceutical distribution throughout the left ventricular myocardium on stress and rest images. No reversible perfusion defect to suggest myocardial ischemia. Gated SPECT images show normal left ventricular wall motion. Calculated left ventricular ejection fraction is 64 %.     1. Normal exam. No scintigraphic evidence of left ventricular myocardial ischemia. 2. Normal left ventricular wall motion. 3. Calculated left ventricular ejection fraction of 64 %. Electronically signed by: Timmy Thurman Date:    01/25/2025 Time:    14:04    Echo    Result Date: 1/25/2025    Left Ventricle: The left ventricle is normal in size. Mildly increased wall thickness. There is mild concentric hypertrophy. There is normal systolic function with a visually estimated ejection fraction of 60 - 65%.   Right Ventricle: Normal right ventricular cavity size. Systolic function is normal.   Left Atrium: Left atrium is mildly dilated.   Aortic Valve: There is mild aortic regurgitation.   Aorta:  Aortic root is mildly dilated measuring 3.30 cm. Ascending aorta is mildly dilated measuring 3.50 cm.     Nuclear Stress Test    Result Date: 1/25/2025    The ECG portion of the study is negative for ischemia.   The patient reported no chest pain during the stress test.   The nuclear portion of this study will be reported separately.     X-Ray Chest PA And Lateral    Result Date: 1/24/2025  EXAMINATION: XR CHEST PA AND LATERAL CLINICAL HISTORY: palpitations; FINDINGS: PA and lateral chest compared with 02/23/2023 shows normal cardiomediastinal silhouette. Lungs are clear. Pulmonary vasculature is normal. No acute osseous abnormality.     Normal chest. Electronically signed by: Timmy Thurman Date:    01/24/2025 Time:    16:55     Pending Diagnostic Studies:       None           Medications:  Reconciled Home Medications:      Medication List        START taking these medications      hydroCHLOROthiazide 25 MG tablet  Commonly known as: HYDRODIURIL  Take 1 tablet (25 mg total) by mouth once daily.     magnesium oxide 400 mg (241.3 mg magnesium) tablet  Commonly known as: MAG-OX  Take 1 tablet (400 mg total) by mouth every evening.     metoprolol tartrate 25 MG tablet  Commonly known as: LOPRESSOR  Take 1 tablet (25 mg total) by mouth 2 (two) times daily.            CONTINUE taking these medications      atorvastatin 40 MG tablet  Commonly known as: LIPITOR  Take 1 tablet (40 mg total) by mouth once daily.     krill oil 500 mg Cap  Take 1 capsule by mouth 2 (two) times daily.     losartan 100 MG tablet  Commonly known as: COZAAR  Take 1 tablet (100 mg total) by mouth once daily.     valACYclovir 500 MG tablet  Commonly known as: VALTREX  Take 1 tablet (500 mg total) by mouth 2 (two) times daily.            STOP taking these medications      methIMAzole 10 MG Tab  Commonly known as: TAPAZOLE              Indwelling Lines/Drains at time of discharge:   Lines/Drains/Airways       None                   Time spent on the  discharge of patient: 47 minutes         Carol Mathur NP  Department of Hospital Medicine  [facility]

## 2025-01-25 NOTE — PLAN OF CARE
Discharge orders and chart reviewed. No other discharge needs noted at this time. Pt is clear for discharge from case management. Pt is discharging to home.    PCP follow up appointment scheduled and added to AVS    S/O to transport patient  home     01/25/25 1500   Final Note   Assessment Type Final Discharge Note   Anticipated Discharge Disposition Home   What phone number can be called within the next 1-3 days to see how you are doing after discharge? [phone]   Hospital Resources/Appts/Education Provided Appointments scheduled and added to AVS   Post-Acute Status   Discharge Delays None known at this time

## 2025-01-25 NOTE — ASSESSMENT & PLAN NOTE
Patient's blood pressure range in the last 24 hours was: BP  Min: 130/68  Max: 161/84.The patient's inpatient anti-hypertensive regimen is listed below:  Current Antihypertensives  metoprolol tartrate (LOPRESSOR) tablet 25 mg, 2 times daily, Oral  nitroGLYCERIN SL tablet 0.4 mg, Every 5 min PRN, Sublingual    Plan  - BP is controlled, no changes needed to their regimen

## 2025-01-25 NOTE — HOSPITAL COURSE
"Mr. Lott has been monitored closely during his hospitalization. He was admitted on 1/24/25 for chest pain while playing pickleball. Troponins were trended and flat at 23-->26-->22. EKG NSR RBBB T wave inversions in lateral leads with no prior EKGs for comparison. CXR with no acute findings. He had a NM stress test that was negative for reversible ischemia. Echo: EF 60-65% with normal diastolic function. His chest pain was described as a tightness and associated with palpitations. He had no arrhythmias noted on telemetry overnight, but did have infrequent PACs. He's had a cough for some time that has been worked up by pulm, ENT, and allergy/immunology and he states the only thing they decided was that he has a "sensitive vagus nerve". His BP was elevated for the duration of his hospitalization with highest reading at 192/89 which improved to 156/80 when losartan was resumed. HTN urgency is likely contributing to the chest tightness he is experiencing. He is prescribed HCTZ, but doesn't take this. Recommend he resume taking HCTZ at discharge. He has a history of hyperthyroidism and was previously on methimazole. TSH/T3/Free T4 are WNL. He was started on metoprolol tartrate 25mg PO BID and will continue this outpt for palpitations along with Mag Ox 400mg PO qHS. Outpt referral to cardiology for evaluation and consideration for a Zio patch has been placed. He has a blood pressure cuff at home and recommend he monitor his blood pressure at home and keep a log and bring to his f/u appt with cardiology and PCP. He was seen and examined on the date of discharge. Strict return prxn provided.  "

## 2025-01-25 NOTE — H&P
[facility] - Emergency Dept  Hospital Medicine  History & Physical    Patient Name: Dimitri Lott  MRN: [id number]  Patient Class: OP- Observation  Admission Date: 1/24/2025  Attending Physician: Logan Mclain MD   Primary Care Provider: Dionicio Sanchez MD         Patient information was obtained from patient and ER records.     Subjective:     Principal Problem:Chest pressure    Chief Complaint:   Chief Complaint   Patient presents with    Abnormal EKG (sent by PCP Karishma Dee NP)        HPI: 55-year-old male with history of hyperthyroidism and hypertension presenting with sensation of a pounding chest while playing pickleball today.  Same event happened 2 years back and seen by Cardiology with a complete workup including coronary CT and stress test which was unremarkable.   Today he had chest discomfort with pounding chest and came to ER. He had hyperthyroidism and was on methimazole and his MD stopped and doing well and today TSH is normal .  EKG with T inversions in V3-4-5  RBBB  otherwise no STEMI.  No leg swelling , fever , SBO and  Denies any family history of cardiac disease.Labs are stable . No PSH        Past Medical History:   Diagnosis Date    Hyperlipidemia     Hyperthyroidism        Past Surgical History:   Procedure Laterality Date    COLONOSCOPY  2019    Dr. -[facility] 5-10 years       Review of patient's allergies indicates:  No Known Allergies    No current facility-administered medications on file prior to encounter.     Current Outpatient Medications on File Prior to Encounter   Medication Sig    methIMAzole (TAPAZOLE) 10 MG Tab TAKE 1 TABLET BY MOUTH EVERY DAY (Patient not taking: Reported on 1/24/2025)    atorvastatin (LIPITOR) 40 MG tablet Take 1 tablet (40 mg total) by mouth once daily.    hydroCHLOROthiazide (HYDRODIURIL) 25 MG tablet Take 1 tablet (25 mg total) by mouth once daily. (Patient not taking: Reported on 1/24/2025)    krill oil 500 mg Cap Take 1 capsule  by mouth 2 (two) times daily.    losartan (COZAAR) 100 MG tablet Take 1 tablet (100 mg total) by mouth once daily.    valACYclovir (VALTREX) 500 MG tablet Take 1 tablet (500 mg total) by mouth 2 (two) times daily.     Family History    None       Tobacco Use    Smoking status: Never    Smokeless tobacco: Never   Substance and Sexual Activity    Alcohol use: Not Currently     Comment: occasional    Drug use: Not Currently    Sexual activity: Not on file     Review of Systems   Constitutional:  Negative for activity change, appetite change and diaphoresis.   HENT:  Negative for congestion, facial swelling and sinus pressure.    Respiratory:  Positive for chest tightness. Negative for shortness of breath and wheezing.    Cardiovascular:  Negative for chest pain and palpitations.   Gastrointestinal:  Negative for abdominal distention and abdominal pain.   Genitourinary:  Negative for dysuria.   Skin:  Negative for color change and pallor.   Neurological:  Negative for dizziness, facial asymmetry, speech difficulty and headaches.   Psychiatric/Behavioral:  Negative for agitation and confusion.      Objective:     Vital Signs (Most Recent):  Temp: 97.6 °F (36.4 °C) (01/24/25 1526)  Pulse: 82 (01/24/25 1526)  Resp: 16 (01/24/25 1526)  BP: (!) 161/84 (01/24/25 1526)  SpO2: 98 % (01/24/25 1526) Vital Signs (24h Range):  Temp:  [97.6 °F (36.4 °C)] 97.6 °F (36.4 °C)  Pulse:  [82-98] 82  Resp:  [16] 16  SpO2:  [98 %] 98 %  BP: (130-161)/(68-84) 161/84     Weight: 95.3 kg (210 lb)  Body mass index is 28.48 kg/m².     Physical Exam  Constitutional:       General: He is not in acute distress.     Appearance: He is well-developed.   HENT:      Head: Normocephalic.      Mouth/Throat:      Mouth: Mucous membranes are moist.   Eyes:      Pupils: Pupils are equal, round, and reactive to light.   Cardiovascular:      Rate and Rhythm: Normal rate and regular rhythm.      Pulses: Normal pulses.   Pulmonary:      Effort: Pulmonary effort  is normal. No respiratory distress.   Abdominal:      General: Abdomen is flat. There is no distension.      Tenderness: There is no abdominal tenderness.   Musculoskeletal:         General: Normal range of motion.      Cervical back: Neck supple.   Skin:     General: Skin is warm.      Findings: No rash.   Neurological:      Mental Status: He is alert and oriented to person, place, and time.   Psychiatric:         Mood and Affect: Mood normal.              CRANIAL NERVES     CN III, IV, VI   Pupils are equal, round, and reactive to light.       Significant Labs: All pertinent labs within the past 24 hours have been reviewed.  CBC:   Recent Labs   Lab 01/24/25  1632   WBC 9.44   HGB 14.8   HCT 42.7        CMP:   Recent Labs   Lab 01/24/25  1632      K 3.6      CO2 25   GLU 93   BUN 24*   CREATININE 1.3   CALCIUM 10.2   PROT 7.9   ALBUMIN 5.0   BILITOT 0.7   ALKPHOS 61   AST 22   ALT 24   ANIONGAP 9     Cardiac Markers:   Recent Labs   Lab 01/24/25  1632   BNP 13       Significant Imaging: I have reviewed all pertinent imaging results/findings within the past 24 hours.  Assessment/Plan:     * Chest pressure  Mild + trop  Trend  ASA , statin , beta blocker       Abnormal EKG  As per HPI  NPO after MN   Hold losartan and add lopressor   Continue rest meds  SAVORTEXiscan myoview tomorrow   Cardio Cx if needed   ECHO       Palpitations  Tele monitor       Graves disease  Now off methimazole and doing well   TSH WNL        Essential hypertension  Patient's blood pressure range in the last 24 hours was: BP  Min: 130/68  Max: 161/84.The patient's inpatient anti-hypertensive regimen is listed below:  Current Antihypertensives  metoprolol tartrate (LOPRESSOR) tablet 25 mg, 2 times daily, Oral  nitroGLYCERIN SL tablet 0.4 mg, Every 5 min PRN, Sublingual    Plan  - BP is controlled, no changes needed to their regimen      Mixed hyperlipidemia  Statin         VTE Risk Mitigation (From admission, onward)            Ordered     IP VTE HIGH RISK PATIENT  Once         01/24/25 1956     Place sequential compression device  Until discontinued         01/24/25 1956                       On 01/24/2025, patient should be placed in hospital observation services under my care.             Logan Mclain MD  Department of Hospital Medicine  [facility] - Emergency Dept

## 2025-01-25 NOTE — SUBJECTIVE & OBJECTIVE
Past Medical History:   Diagnosis Date    Hyperlipidemia     Hyperthyroidism        Past Surgical History:   Procedure Laterality Date    COLONOSCOPY  2019    Dr. -[facility] 5-10 years       Review of patient's allergies indicates:  No Known Allergies    No current facility-administered medications on file prior to encounter.     Current Outpatient Medications on File Prior to Encounter   Medication Sig    methIMAzole (TAPAZOLE) 10 MG Tab TAKE 1 TABLET BY MOUTH EVERY DAY (Patient not taking: Reported on 1/24/2025)    atorvastatin (LIPITOR) 40 MG tablet Take 1 tablet (40 mg total) by mouth once daily.    hydroCHLOROthiazide (HYDRODIURIL) 25 MG tablet Take 1 tablet (25 mg total) by mouth once daily. (Patient not taking: Reported on 1/24/2025)    krill oil 500 mg Cap Take 1 capsule by mouth 2 (two) times daily.    losartan (COZAAR) 100 MG tablet Take 1 tablet (100 mg total) by mouth once daily.    valACYclovir (VALTREX) 500 MG tablet Take 1 tablet (500 mg total) by mouth 2 (two) times daily.     Family History    None       Tobacco Use    Smoking status: Never    Smokeless tobacco: Never   Substance and Sexual Activity    Alcohol use: Not Currently     Comment: occasional    Drug use: Not Currently    Sexual activity: Not on file     Review of Systems   Constitutional:  Negative for activity change, appetite change and diaphoresis.   HENT:  Negative for congestion, facial swelling and sinus pressure.    Respiratory:  Positive for chest tightness. Negative for shortness of breath and wheezing.    Cardiovascular:  Negative for chest pain and palpitations.   Gastrointestinal:  Negative for abdominal distention and abdominal pain.   Genitourinary:  Negative for dysuria.   Skin:  Negative for color change and pallor.   Neurological:  Negative for dizziness, facial asymmetry, speech difficulty and headaches.   Psychiatric/Behavioral:  Negative for agitation and confusion.      Objective:     Vital Signs (Most  Recent):  Temp: 97.6 °F (36.4 °C) (01/24/25 1526)  Pulse: 82 (01/24/25 1526)  Resp: 16 (01/24/25 1526)  BP: (!) 161/84 (01/24/25 1526)  SpO2: 98 % (01/24/25 1526) Vital Signs (24h Range):  Temp:  [97.6 °F (36.4 °C)] 97.6 °F (36.4 °C)  Pulse:  [82-98] 82  Resp:  [16] 16  SpO2:  [98 %] 98 %  BP: (130-161)/(68-84) 161/84     Weight: 95.3 kg (210 lb)  Body mass index is 28.48 kg/m².     Physical Exam  Constitutional:       General: He is not in acute distress.     Appearance: He is well-developed.   HENT:      Head: Normocephalic.      Mouth/Throat:      Mouth: Mucous membranes are moist.   Eyes:      Pupils: Pupils are equal, round, and reactive to light.   Cardiovascular:      Rate and Rhythm: Normal rate and regular rhythm.      Pulses: Normal pulses.   Pulmonary:      Effort: Pulmonary effort is normal. No respiratory distress.   Abdominal:      General: Abdomen is flat. There is no distension.      Tenderness: There is no abdominal tenderness.   Musculoskeletal:         General: Normal range of motion.      Cervical back: Neck supple.   Skin:     General: Skin is warm.      Findings: No rash.   Neurological:      Mental Status: He is alert and oriented to person, place, and time.   Psychiatric:         Mood and Affect: Mood normal.              CRANIAL NERVES     CN III, IV, VI   Pupils are equal, round, and reactive to light.       Significant Labs: All pertinent labs within the past 24 hours have been reviewed.  CBC:   Recent Labs   Lab 01/24/25  1632   WBC 9.44   HGB 14.8   HCT 42.7        CMP:   Recent Labs   Lab 01/24/25  1632      K 3.6      CO2 25   GLU 93   BUN 24*   CREATININE 1.3   CALCIUM 10.2   PROT 7.9   ALBUMIN 5.0   BILITOT 0.7   ALKPHOS 61   AST 22   ALT 24   ANIONGAP 9     Cardiac Markers:   Recent Labs   Lab 01/24/25  1632   BNP 13       Significant Imaging: I have reviewed all pertinent imaging results/findings within the past 24 hours.

## 2025-01-27 ENCOUNTER — PATIENT OUTREACH (OUTPATIENT)
Dept: FAMILY MEDICINE | Facility: CLINIC | Age: 55
End: 2025-01-27
Payer: COMMERCIAL

## 2025-01-27 ENCOUNTER — TELEPHONE (OUTPATIENT)
Dept: FAMILY MEDICINE | Facility: CLINIC | Age: 55
End: 2025-01-27
Payer: COMMERCIAL

## 2025-01-27 DIAGNOSIS — E78.2 MIXED HYPERLIPIDEMIA: Primary | ICD-10-CM

## 2025-01-27 DIAGNOSIS — E78.2 ELEVATED CHOLESTEROL WITH HIGH TRIGLYCERIDES: ICD-10-CM

## 2025-01-27 NOTE — TELEPHONE ENCOUNTER
Spoke with pt who states he's been feeling good since discharge. He states that he wasn't feeling bad when he was admitted but he was having a pounding heartbeat. He is still having that but it seems to be getting a little better, but he doesn't know for sure because he just started taking the metoprolol tartrate. Pt is to follow up with cardiology, which will call and set an appt with him. HFU scheduled. TCC note done.

## 2025-01-27 NOTE — TELEPHONE ENCOUNTER
Spoke with pt in regards to HFU. Pt wondering if he should get lipid panel or any other lab work done. Please advise.

## 2025-01-27 NOTE — TELEPHONE ENCOUNTER
----- Message from Nurse Christina sent at 1/27/2025  1:13 PM CST -----  Call patient - needs post-hospital phone call within 2 business days and hospital follow up visit scheduled within 7-14 days.

## 2025-01-27 NOTE — PROGRESS NOTES
Discharge Information     Discharge Date:  1/25/2025     Primary Discharge Diagnosis: Chest pressure      Discharge Summary:  Reviewed      Medication & Order Review     Were medication changes made or new medications added?   Yes    If so, has the patient filled the prescriptions?  Yes     Was Home Health ordered? No    If so, has Home Health contacted patient and/or initiated services?  No    Name of Home Health Agency? N/A    Durable Medical Equipment ordered?  No     If so, has the DME provider contacted patient and delivered equipment?  N/A    Follow Up               Any problems since discharge? No    How is the patient feeling since returning home?      Have you set up recommended follow up appointments?  (cardiology, surgery, etc.)    Schedule Hospital Follow-up appointment within 7-14 days (preferably 7).      Notes:  Spoke with pt who states he's been feeling good since discharge. He states that he wasn't feeling bad when he was admitted but he was having a pounding heartbeat. He is still having that but it seems to be getting a little better, but he doesn't know for sure because he just started taking the metoprolol tartrate. Pt is to follow up with cardiology, which will call and set an appt with him. HFU scheduled.           Savanna Boo

## 2025-01-29 ENCOUNTER — PATIENT MESSAGE (OUTPATIENT)
Dept: ADMINISTRATIVE | Facility: CLINIC | Age: 55
End: 2025-01-29
Payer: COMMERCIAL

## 2025-01-29 ENCOUNTER — PATIENT OUTREACH (OUTPATIENT)
Dept: ADMINISTRATIVE | Facility: CLINIC | Age: 55
End: 2025-01-29
Payer: COMMERCIAL

## 2025-01-30 LAB
OHS QRS DURATION: 122 MS
OHS QTC CALCULATION: 437 MS

## 2025-01-30 NOTE — PROGRESS NOTES
C3 nurse attempted to contact Dimitri SHAW Waliludwig for a TCC post hospital discharge follow up call. The patient is unable to conduct the call @ this time and  requested a callback this afternoon.    The patient has a scheduled HOSFU with Karishma Dee CNP on 2/3/25 at 1120. No messages routed at this time.   
C3 nurse spoke with Dimitri Lott for a TCC post hospital discharge follow up call. The patient has a scheduled HOSFU with Karishma Dee CNP on 2/3/25 at 1120. No messages routed at this time.     
[employer]

## 2025-01-31 ENCOUNTER — HOSPITAL ENCOUNTER (OUTPATIENT)
Dept: CARDIOLOGY | Facility: CLINIC | Age: 55
Discharge: HOME | End: 2025-01-31
Payer: COMMERCIAL

## 2025-01-31 DIAGNOSIS — R07.89 CHEST PRESSURE: ICD-10-CM

## 2025-01-31 DIAGNOSIS — R00.2 PALPITATIONS: ICD-10-CM

## 2025-01-31 DIAGNOSIS — R94.31 ABNORMAL EKG: ICD-10-CM

## 2025-01-31 PROCEDURE — 93242 EXT ECG>48HR<7D RECORDING: CPT | Mod: ,,, | Performed by: GENERAL PRACTICE

## 2025-01-31 PROCEDURE — 93244 EXT ECG>48HR<7D REV&INTERPJ: CPT | Mod: ,,, | Performed by: GENERAL PRACTICE

## 2025-02-07 ENCOUNTER — OFFICE VISIT (OUTPATIENT)
Dept: FAMILY MEDICINE | Facility: CLINIC | Age: 55
End: 2025-02-07
Payer: COMMERCIAL

## 2025-02-07 ENCOUNTER — HOSPITAL ENCOUNTER (OUTPATIENT)
Dept: RADIOLOGY | Facility: HOSPITAL | Age: 55
Discharge: HOME | End: 2025-02-07
Payer: COMMERCIAL

## 2025-02-07 VITALS
OXYGEN SATURATION: 99 % | WEIGHT: 206 LBS | HEART RATE: 59 BPM | SYSTOLIC BLOOD PRESSURE: 130 MMHG | DIASTOLIC BLOOD PRESSURE: 66 MMHG | HEIGHT: 72 IN | BODY MASS INDEX: 27.9 KG/M2

## 2025-02-07 DIAGNOSIS — R94.31 ABNORMAL EKG: ICD-10-CM

## 2025-02-07 DIAGNOSIS — R07.2 SUBSTERNAL CHEST PAIN: ICD-10-CM

## 2025-02-07 DIAGNOSIS — F41.9 ANXIETY: ICD-10-CM

## 2025-02-07 DIAGNOSIS — Q25.40 ABNORMALITY OF ASCENDING AORTA: Primary | ICD-10-CM

## 2025-02-07 DIAGNOSIS — I10 ESSENTIAL HYPERTENSION: ICD-10-CM

## 2025-02-07 DIAGNOSIS — Q25.40 ABNORMALITY OF ASCENDING AORTA: ICD-10-CM

## 2025-02-07 PROCEDURE — 71250 CT THORAX DX C-: CPT | Mod: 26,,, | Performed by: RADIOLOGY

## 2025-02-07 PROCEDURE — 71250 CT THORAX DX C-: CPT | Mod: TC

## 2025-02-07 PROCEDURE — 74150 CT ABDOMEN W/O CONTRAST: CPT | Mod: 26,,, | Performed by: RADIOLOGY

## 2025-02-07 RX ORDER — ALPRAZOLAM 0.5 MG/1
0.5 TABLET ORAL 2 TIMES DAILY PRN
Qty: 20 TABLET | Refills: 0 | Status: SHIPPED | OUTPATIENT
Start: 2025-02-07

## 2025-02-07 NOTE — PATIENT INSTRUCTIONS
Call cardiology office to get appointment scheduled with Dr. Burgos or first available.   [phone]     Call [phone] schedule CT

## 2025-02-08 LAB
OHS CV EVENT MONITOR DAY: 3
OHS CV HOLTER HOOKUP DATE: NORMAL
OHS CV HOLTER HOOKUP TIME: NORMAL
OHS CV HOLTER LENGTH DECIMAL HOURS: 91
OHS CV HOLTER LENGTH HOURS: 19
OHS CV HOLTER LENGTH MINUTES: 0
OHS CV HOLTER SCAN DATE: NORMAL
OHS CV HOLTER SINUS AVERAGE HR: 62 BPM
OHS CV HOLTER SINUS MAX HR: 151 BPM
OHS CV HOLTER SINUS MIN HR: 47 BPM
OHS CV HOLTER STUDY END DATE: NORMAL
OHS CV HOLTER STUDY END TIME: NORMAL

## 2025-02-09 ENCOUNTER — PATIENT MESSAGE (OUTPATIENT)
Dept: FAMILY MEDICINE | Facility: CLINIC | Age: 55
End: 2025-02-09
Payer: COMMERCIAL

## 2025-02-09 DIAGNOSIS — I71.21 ANEURYSM OF ASCENDING AORTA WITHOUT RUPTURE: Primary | ICD-10-CM

## 2025-02-09 DIAGNOSIS — K21.9 GASTROESOPHAGEAL REFLUX DISEASE, UNSPECIFIED WHETHER ESOPHAGITIS PRESENT: Primary | ICD-10-CM

## 2025-02-10 RX ORDER — PANTOPRAZOLE SODIUM 40 MG/1
40 TABLET, DELAYED RELEASE ORAL NIGHTLY
Qty: 90 TABLET | Refills: 3 | Status: SHIPPED | OUTPATIENT
Start: 2025-02-10 | End: 2026-02-10

## 2025-02-10 NOTE — PROGRESS NOTES
SUBJECTIVE:    Patient ID: Dimitri Lott is a 55 y.o. male.    Chief Complaint: Follow-up (No bottles//pt here for HFU for irregular EKG. Pt is still having symptoms//Pt going to wait 5 more years for colo//JL)      Admit Date: 1/24/25   Discharge Date: 1/25/25  Discharge Facility: Hospital    Medication Reconciliation:  No medication changes.   New Prescriptions filled after discharge: not applicable  Discharge summary reviewed:  yes  Pending test results at discharge reviewed:   not applicable  Follow up appointments scheduled:  no   with Cardiology   Follow up labs/tests ordered:   not applicable  Home Health ordered on discharge:   not applicable  Home Health company name:   DME ordered at discharge:   not applicable  Disease/illness education: blood pressure control  Discussion with other health care providers: not applicable  Establishment or re-establishment of referral orders for community resources: No other necessary community resources  Family and/or Caretaker present at visit?  not applicable    Discharge Summary:reviewed    How patient is feeling since discharge from the hospital?  Still having symptoms        Follow-up      History of Present Illness    CHIEF COMPLAINT:  Dimitri presents today for follow up of pounding heart sensation    HISTORY OF PRESENT ILLNESS:  He reports ongoing pounding heart sensation, though symptoms have improved. The sensation is localized to the heart area rather than the chest and worsens when lying down, affecting sleep onset. Symptoms are more noticeable when sitting and improve with physical activity such as pickleball. He denies pain or other cardiac symptoms. He recently experienced a pulsing sensation in the femoral artery region lasting 2 days that felt like a heartbeat, which has since resolved.    RECENT HOSPITALIZATION:  He reports his first ever hospitalization during a recent ER visit. Two EKGs were abnormal with mildly elevated troponin. Subsequent  stress test was normal.    MEDICATIONS:  He recently restarted statin medication on Wednesday, taking one tablet nightly. He previously trialed acid reflux medication for a few weeks without symptom improvement. He self-discontinued blood pressure medication to monitor blood pressure values and assess how he felt without medication.    SOCIAL HISTORY:  He works as a , involving frequent travel and working 6-7 days per week when actively employed.      ROS:  General: -fever, -chills, -fatigue, -weight gain, -weight loss  Eyes: -vision changes, -redness, -discharge  ENT: -ear pain, -nasal congestion, -sore throat  Cardiovascular: -chest pain, +palpitations, -lower extremity edema  Respiratory: -cough, -shortness of breath  Gastrointestinal: -abdominal pain, -nausea, -vomiting, -diarrhea, -constipation, -blood in stool  Genitourinary: -dysuria, -hematuria, -frequency  Musculoskeletal: -joint pain, -muscle pain  Skin: -rash, -lesion  Neurological: -headache, -dizziness, -numbness, -tingling  Psychiatric: -anxiety, -depression, +sleep difficulty         Hospital Outpatient Visit on 01/31/2025   Component Date Value Ref Range Status    Holter Hookup Date 01/31/2025 [id number]   Final    Holter Hookup Time 01/31/2025 [id number]   Final    Holter Study End Date 01/31/2025 [id number]   Final    Holter Study End Time 01/31/2025 [id number]   Final    Holter Scan Date 01/31/2025 [id number]   Final    Sinus min HR 01/31/2025 47  bpm Final    Sinus max hr 01/31/2025 151  bpm Final    Sinus avg hr 01/31/2025 62  bpm Final    Event Monitor Day 01/31/2025 3   Final    Holter length hours 01/31/2025 19   Final    holter length minutes 01/31/2025 0   Final    holter length dec hours 01/31/2025 91.00   Final   Orders Only on 01/27/2025   Component Date Value Ref Range Status    Cholesterol 02/04/2025 240 (H)  <200 mg/dL Final    HDL 02/04/2025 40  >39 mg/dL Final    Triglycerides 02/04/2025 222 (H)  <150 mg/dL Final     LDL Cholesterol 02/04/2025 162 (H)  <100 mg/dL (calc) Final    HDL/Cholesterol Ratio 02/04/2025 6.0 (H)  <5.0 calc Final    Non HDL Chol. (LDL+VLDL) 02/04/2025 200 (H)  <130 mg/dL (calc) Final    Lipoprotein.beta.subparticle 02/04/2025 2,620 (H)  <1,138 nmol/L Final    Lipoprotein.beta.subparticle.small 02/04/2025 698 (H)  <142 nmol/L Final    LDL MEDIUM 02/04/2025 455 (H)  <215 nmol/L Final    Lipoprotein.alpha.subparticle.large 02/04/2025 5,501 (L)  >6,729 nmol/L Final    Cholesterol.in LDL real size patte* 02/04/2025 B (A)  A Pattern Final    Lipoprotein.beta.subparticle 02/04/2025 213.7 (L)  >222.9 Angstrom Final    Apolipoprotein B 02/04/2025 144 (H)  <90 mg/dL Final    Lipoprotein (a) 02/04/2025 39  <75 nmol/L Final   Admission on 01/24/2025, Discharged on 01/25/2025   Component Date Value Ref Range Status    Hepatitis C Ab 01/24/2025 Negative  Negative Final    HIV 1/2 Ag/Ab 01/24/2025 Negative  Negative Final    Magnesium 01/24/2025 1.9  1.6 - 2.6 mg/dL Final    QRS Duration 01/24/2025 122  ms Final    [facility]HS QTC Calculation 01/24/2025 437  ms Final    WBC 01/24/2025 9.44  3.90 - 12.70 K/uL Final    RBC 01/24/2025 4.85  4.60 - 6.20 M/uL Final    Hemoglobin 01/24/2025 14.8  14.0 - 18.0 g/dL Final    Hematocrit 01/24/2025 42.7  40.0 - 54.0 % Final    MCV 01/24/2025 88  82 - 98 fL Final    MCH 01/24/2025 30.5  27.0 - 31.0 pg Final    MCHC 01/24/2025 34.7  32.0 - 36.0 g/dL Final    RDW 01/24/2025 12.2  11.5 - 14.5 % Final    Platelets 01/24/2025 298  150 - 450 K/uL Final    MPV 01/24/2025 9.1 (L)  9.2 - 12.9 fL Final    Immature Granulocytes 01/24/2025 0.3  0.0 - 0.5 % Final    Gran # (ANC) 01/24/2025 6.8  1.8 - 7.7 K/uL Final    Immature Grans (Abs) 01/24/2025 0.03  0.00 - 0.04 K/uL Final    Lymph # 01/24/2025 1.7  1.0 - 4.8 K/uL Final    Mono # 01/24/2025 0.6  0.3 - 1.0 K/uL Final    Eos # 01/24/2025 0.2  0.0 - 0.5 K/uL Final    Baso # 01/24/2025 0.06  0.00 - 0.20 K/uL Final    nRBC 01/24/2025 0  0 /100 WBC Final     Gran % 01/24/2025 71.8  38.0 - 73.0 % Final    Lymph % 01/24/2025 18.3  18.0 - 48.0 % Final    Mono % 01/24/2025 6.5  4.0 - 15.0 % Final    Eosinophil % 01/24/2025 2.5  0.0 - 8.0 % Final    Basophil % 01/24/2025 0.6  0.0 - 1.9 % Final    Differential Method 01/24/2025 Automated   Final    Sodium 01/24/2025 137  136 - 145 mmol/L Final    Potassium 01/24/2025 3.6  3.5 - 5.1 mmol/L Final    Chloride 01/24/2025 103  95 - 110 mmol/L Final    CO2 01/24/2025 25  23 - 29 mmol/L Final    Glucose 01/24/2025 93  70 - 110 mg/dL Final    BUN 01/24/2025 24 (H)  6 - 20 mg/dL Final    Creatinine 01/24/2025 1.3  0.5 - 1.4 mg/dL Final    Calcium 01/24/2025 10.2  8.7 - 10.5 mg/dL Final    Total Protein 01/24/2025 7.9  6.0 - 8.4 g/dL Final    Albumin 01/24/2025 5.0  3.5 - 5.2 g/dL Final    Total Bilirubin 01/24/2025 0.7  0.1 - 1.0 mg/dL Final    Alkaline Phosphatase 01/24/2025 61  55 - 135 U/L Final    AST 01/24/2025 22  10 - 40 U/L Final    ALT 01/24/2025 24  10 - 44 U/L Final    eGFR 01/24/2025 >60.0  >60 mL/min/1.73 m^2 Final    Anion Gap 01/24/2025 9  8 - 16 mmol/L Final    BNP 01/24/2025 13  0 - 99 pg/mL Final    Troponin I High Sensitivity 01/24/2025 23.7 (H)  0.0 - 14.9 pg/mL Final    TSH 01/24/2025 2.053  0.340 - 5.600 uIU/mL Final    Specimen UA 01/24/2025 Urine, Clean Catch   Final    Color, UA 01/24/2025 Yellow  Yellow, Straw, Catherine Final    Appearance, UA 01/24/2025 Clear  Clear Final    pH, UA 01/24/2025 6.0  5.0 - 8.0 Final    Specific Gravity, UA 01/24/2025 1.015  1.005 - 1.030 Final    Protein, UA 01/24/2025 Negative  Negative Final    Glucose, UA 01/24/2025 Negative  Negative Final    Ketones, UA 01/24/2025 Negative  Negative Final    Bilirubin (UA) 01/24/2025 Negative  Negative Final    Occult Blood UA 01/24/2025 Negative  Negative Final    Nitrite, UA 01/24/2025 Negative  Negative Final    Urobilinogen, UA 01/24/2025 Negative  Negative EU/dL Final    Leukocytes, UA 01/24/2025 Negative  Negative Final    Influenza  A, Molecular 01/24/2025 Negative  Negative Final    Influenza B, Molecular 01/24/2025 Negative  Negative Final    Flu A & B Source 01/24/2025 Nasal swab   Final    Troponin I High Sensitivity 01/24/2025 26.0 (H)  0.0 - 14.9 pg/mL Final    Carreno's Biplane MOD Ejection Fra* 01/25/2025 63  % Final    A2C EF 01/25/2025 66  % Final    A4C EF 01/25/2025 61  % Final    LVOT stroke volume 01/25/2025 96.9  cm3 Final    LVIDd 01/25/2025 4.2  3.5 - 6.0 cm Final    LV Systolic Volume 01/25/2025 24.61  mL Final    LV Systolic Volume Index 01/25/2025 11.4  mL/m2 Final    LVIDs 01/25/2025 2.6  2.1 - 4.0 cm Final    LV Diastolic Volume 01/25/2025 78.58  mL Final    LV Diastolic Volume Index 01/25/2025 36.55  mL/m2 Final    LV EDV A2C 01/25/2025 [id number]  mL Final    LV EDV A4C 01/25/2025 101.00  mL Final    Left Ventricular End Systolic Volu* 01/25/2025 24.61  mL Final    Left Ventricular End Diastolic Vol* 01/25/2025 78.58  mL Final    IVS 01/25/2025 1.2 (A)  0.6 - 1.1 cm Final    LVOT diameter 01/25/2025 2.1  cm Final    LVOT area 01/25/2025 3.5  cm2 Final    FS 01/25/2025 38.1  28 - 44 % Final    Left Ventricle Relative Wall Thick* 01/25/2025 0.52  cm Final    PW 01/25/2025 1.1  0.6 - 1.1 cm Final    LV mass 01/25/2025 167.4  g Final    LV Mass Index 01/25/2025 77.9  g/m2 Final    MV Peak E Dariel 01/25/2025 0.67  m/s Final    TDI LATERAL 01/25/2025 0.10  m/s Final    TDI SEPTAL 01/25/2025 0.07  m/s Final    E/E' ratio 01/25/2025 8  m/s Final    MV Peak A Dariel 01/25/2025 0.92  m/s Final    TR Max Dariel 01/25/2025 2.2  m/s Final    E/A ratio 01/25/2025 0.73   Final    E wave deceleration time 01/25/2025 257  msec Final    LV SEPTAL E/E' RATIO 01/25/2025 9.6  m/s Final    LV LATERAL E/E' RATIO 01/25/2025 6.7  m/s Final    LVOT peak dariel 01/25/2025 1.3  m/s Final    Left Ventricular Outflow Tract Sulma* 01/25/2025 0.86  cm/s Final    Left Ventricular Outflow Tract Sulma* 01/25/2025 4.00  mmHg Final    RV- armstrong basal diam  01/25/2025 3.8  cm Final    RV S' 01/25/2025 12.80  cm/s Final    TAPSE 01/25/2025 2.14  cm Final    RV/LV Ratio 01/25/2025 0.90  cm Final    LA size 01/25/2025 4.0  cm Final    AV regurgitation pressure 1/2 time 01/25/2025 440  ms Final    AR Max Dariel 01/25/2025 3.87  m/s Final    AV mean gradient 01/25/2025 6  mmHg Final    AV peak gradient 01/25/2025 10  mmHg Final    Ao peak dariel 01/25/2025 1.6  m/s Final    Ao VTI 01/25/2025 32.5  cm Final    LVOT peak VTI 01/25/2025 28.0  cm Final    AV valve area 01/25/2025 3.0  cm² Final    AV Velocity Ratio 01/25/2025 0.81   Final    AV index (prosthetic) 01/25/2025 0.86   Final    SHAWANDA by Velocity Ratio 01/25/2025 2.8  cm² Final    Mr max dariel 01/25/2025 4.33  m/s Final    MV mean gradient 01/25/2025 2  mmHg Final    MV peak gradient 01/25/2025 5  mmHg Final    MV stenosis pressure 1/2 time 01/25/2025 51.00  ms Final    MV valve area p 1/2 method 01/25/2025 4.31  cm2 Final    MV valve area by continuity eq 01/25/2025 2.83  cm2 Final    MV VTI 01/25/2025 34.3  cm Final    Triscuspid Valve Regurgitation Pea* 01/25/2025 19  mmHg Final    PV PEAK VELOCITY 01/25/2025 1.33  m/s Final    PV peak gradient 01/25/2025 7  mmHg Final    Pulmonary Valve Mean Velocity 01/25/2025 0.91  m/s Final    Ao root annulus 01/25/2025 3.20  cm Final    Sinus 01/25/2025 3.30  cm Final    Ascending aorta 01/25/2025 3.50  cm Final    IVC diameter 01/25/2025 1.50  cm Final    Mean e' 01/25/2025 0.09  m/s Final    ZLVIDS 01/25/2025 -3.89   Final    ZLVIDD 01/25/2025 -5.08   Final    RVDD 01/25/2025 3.80  cm Final    AORTIC VALVE CUSP SEPERATION 01/25/2025 1.90  cm Final    BSA 01/25/2025 2.17  m2 Final    85% Max Predicted HR 01/25/2025 139   Final    Max Predicted HR 01/25/2025 163   Final    OHS CV CPX PATIENT IS MALE 01/25/2025 1.0   Final    OHS CV CPX PATIENT IS FEMALE 01/25/2025 0.0   Final    HR at rest 01/25/2025 62  bpm Final    Systolic blood pressure 01/25/2025 167  mmHg Final    Diastolic blood  pressure 01/25/2025 107  mmHg Final    RPP 01/25/2025 10,354   Final    Peak HR 01/25/2025 99.0  bpm Final    Peak Systolic BP 01/25/2025 223  mmHg Final    Peak Diatolic BP 01/25/2025 94  mmHg Final    Peak RPP 01/25/2025 22,077   Final    % Max HR Achieved 01/25/2025 61   Final    1 Minute Recovery HR 01/25/2025 97  bpm Final    dose 01/25/2025 0.4  mg Final    Troponin I High Sensitivity 01/24/2025 22.7 (H)  0.0 - 14.9 pg/mL Final   Office Visit on 01/24/2025   Component Date Value Ref Range Status    EKG 12-Lead 01/24/2025    Corrected    Ventricular Rate 01/24/2025 79   Corrected    NV Interval 01/24/2025 138ms   Corrected    QRS Duration 01/24/2025 130ms   Corrected    QT/QTc 01/24/2025 408/467ms   Corrected    PRT Axes 01/24/2025 18/ -8/ -30   Corrected   Orders Only on 01/22/2025   Component Date Value Ref Range Status    WBC 01/24/2025 5.7  3.8 - 10.8 Thousand/uL Final    RBC 01/24/2025 5.26  4.20 - 5.80 Million/uL Final    Hemoglobin 01/24/2025 16.0  13.2 - 17.1 g/dL Final    Hematocrit 01/24/2025 47.3  38.5 - 50.0 % Final    MCV 01/24/2025 89.9  80.0 - 100.0 fL Final    MCH 01/24/2025 30.4  27.0 - 33.0 pg Final    MCHC 01/24/2025 33.8  32.0 - 36.0 g/dL Final    RDW 01/24/2025 12.1  11.0 - 15.0 % Final    Platelets 01/24/2025 307  140 - 400 Thousand/uL Final    MPV 01/24/2025 10.1  7.5 - 12.5 fL Final    Neutrophils, Abs 01/24/2025 2,816  1,500 - 7,800 cells/uL Final    Lymph # 01/24/2025 1,915  850 - 3,900 cells/uL Final    Mono # 01/24/2025 485  200 - 950 cells/uL Final    Eos # 01/24/2025 445  15 - 500 cells/uL Final    Baso # 01/24/2025 40  0 - 200 cells/uL Final    Neutrophils Relative 01/24/2025 49.4  % Final    Lymph % 01/24/2025 33.6  % Final    Mono % 01/24/2025 8.5  % Final    Eosinophil % 01/24/2025 7.8  % Final    Basophil % 01/24/2025 0.7  % Final    Ferritin 01/24/2025 171  38 - 380 ng/mL Final    Iron 01/24/2025 167  50 - 180 mcg/dL Final    TIBC 01/24/2025 387  250 - 425 mcg/dL (calc) Final     Iron Saturation 01/24/2025 43  20 - 48 % (calc) Final    Glucose 01/24/2025 91  65 - 99 mg/dL Final    BUN 01/24/2025 20  7 - 25 mg/dL Final    Creatinine 01/24/2025 1.10  0.70 - 1.30 mg/dL Final    eGFR 01/24/2025 78  > OR = 60 mL/min/1.73m2 Final    BUN/Creatinine Ratio 01/24/2025 SEE NOTE:  6 - 22 (calc) Final    Sodium 01/24/2025 138  135 - 146 mmol/L Final    Potassium 01/24/2025 4.8  3.5 - 5.3 mmol/L Final    Chloride 01/24/2025 99  98 - 110 mmol/L Final    CO2 01/24/2025 28  20 - 32 mmol/L Final    Calcium 01/24/2025 10.1  8.6 - 10.3 mg/dL Final    Total Protein 01/24/2025 7.9  6.1 - 8.1 g/dL Final    Albumin 01/24/2025 4.9  3.6 - 5.1 g/dL Final    Globulin, Total 01/24/2025 3.0  1.9 - 3.7 g/dL (calc) Final    Albumin/Globulin Ratio 01/24/2025 1.6  1.0 - 2.5 (calc) Final    Total Bilirubin 01/24/2025 0.9  0.2 - 1.2 mg/dL Final    Alkaline Phosphatase 01/24/2025 70  35 - 144 U/L Final    AST 01/24/2025 23  10 - 35 U/L Final    ALT 01/24/2025 25  9 - 46 U/L Final    T3, Free 01/24/2025 3.6  2.3 - 4.2 pg/mL Final    T4, Free 01/24/2025 1.3  0.8 - 1.8 ng/dL Final    TSH 01/24/2025 2.28  0.40 - 4.50 mIU/L Final    Magnesium 01/24/2025 2.1  1.5 - 2.5 mg/dL Final       Past Medical History:   Diagnosis Date    Hyperlipidemia     Hyperthyroidism      Social History     Socioeconomic History    Marital status:    Tobacco Use    Smoking status: Never    Smokeless tobacco: Never   Substance and Sexual Activity    Alcohol use: Not Currently     Comment: occasional    Drug use: Not Currently     Social Drivers of Health     Financial Resource Strain: Low Risk  (1/24/2025)    Overall Financial Resource Strain (CARDIA)     Difficulty of Paying Living Expenses: Not hard at all   Food Insecurity: No Food Insecurity (1/24/2025)    Hunger Vital Sign     Worried About Running Out of Food in the Last Year: Never true     Ran Out of Food in the Last Year: Never true   Transportation Needs: No Transportation Needs  (1/24/2025)    TRANSPORTATION NEEDS     Transportation : No   Physical Activity: Sufficiently Active (7/17/2024)    Exercise Vital Sign     Days of Exercise per Week: 3 days     Minutes of Exercise per Session: 60 min   Stress: No Stress Concern Present (1/24/2025)    [employer] - Occupational Stress Questionnaire     Feeling of Stress : Not at all   Housing Stability: Unknown (1/24/2025)    Housing Stability Vital Sign     Unable to Pay for Housing in the Last Year: No     Homeless in the Last Year: No     Past Surgical History:   Procedure Laterality Date    COLONOSCOPY  2019    Dr. -RTC 5-10 years     No family history on file.    The 10-year CVD risk score (NUPURAgoobinna, et al., 2008) is: 23.9%    Values used to calculate the score:      Age: 55 years      Sex: Male      Diabetic: No      Tobacco smoker: No      Systolic Blood Pressure: 130 mmHg      Is BP treated: Yes      HDL Cholesterol: 40 mg/dL      Total Cholesterol: 240 mg/dL    Tests to Keep You Healthy    Colon Cancer Screening: DUE  Last Blood Pressure <= 139/89 (2/7/2025): Yes      Review of patient's allergies indicates:  No Known Allergies    Current Outpatient Medications:     atorvastatin (LIPITOR) 40 MG tablet, Take 1 tablet (40 mg total) by mouth once daily., Disp: 90 tablet, Rfl: 3    hydroCHLOROthiazide (HYDRODIURIL) 25 MG tablet, Take 1 tablet (25 mg total) by mouth once daily., Disp: 90 tablet, Rfl: 3    krill oil 500 mg Cap, Take 1 capsule by mouth 2 (two) times daily., Disp: , Rfl:     losartan (COZAAR) 100 MG tablet, Take 1 tablet (100 mg total) by mouth once daily., Disp: 90 tablet, Rfl: 3    magnesium oxide (MAG-OX) 400 mg (241.3 mg magnesium) tablet, Take 1 tablet (400 mg total) by mouth every evening., Disp: 30 tablet, Rfl: 0    metoprolol tartrate (LOPRESSOR) 25 MG tablet, Take 1 tablet (25 mg total) by mouth 2 (two) times daily., Disp: 180 tablet, Rfl: 0    valACYclovir (VALTREX) 500 MG tablet,  Take 1 tablet (500 mg total) by mouth 2 (two) times daily., Disp: 20 tablet, Rfl: 3    ALPRAZolam (XANAX) 0.5 MG tablet, Take 1 tablet (0.5 mg total) by mouth 2 (two) times daily as needed for Anxiety., Disp: 20 tablet, Rfl: 0    pantoprazole (PROTONIX) 40 MG tablet, Take 1 tablet (40 mg total) by mouth every evening., Disp: 90 tablet, Rfl: 3    Review of Systems        Objective:      Vitals:    02/07/25 0853   BP: 130/66   Pulse: (!) 59   SpO2: 99%   Weight: 93.4 kg (206 lb)   Height: 6' (1.829 m)     Physical Exam  Vitals and nursing note reviewed.   Constitutional:       General: He is not in acute distress.     Appearance: Normal appearance. He is well-developed, well-groomed and normal weight. He is not ill-appearing.   HENT:      Head: Normocephalic and atraumatic.   Eyes:      General: No scleral icterus.  Neck:      Thyroid: No thyromegaly.      Vascular: No carotid bruit.   Cardiovascular:      Rate and Rhythm: Normal rate and regular rhythm.      Heart sounds: Normal heart sounds. No murmur heard.  Pulmonary:      Effort: Pulmonary effort is normal.      Breath sounds: Normal breath sounds.   Abdominal:      Palpations: Abdomen is soft.      Tenderness: There is no abdominal tenderness.   Musculoskeletal:         General: Normal range of motion.      Lumbar back: Normal. No spasms.      Right lower leg: No edema.      Left lower leg: No edema.   Skin:     General: Skin is warm and dry.      Capillary Refill: Capillary refill takes less than 2 seconds.      Coloration: Skin is not jaundiced.      Findings: No rash.   Neurological:      General: No focal deficit present.      Mental Status: He is alert. Mental status is at baseline.   Psychiatric:         Behavior: Behavior is cooperative.       Physical Exam            Assessment:       1. Abnormality of ascending aorta    2. Substernal chest pain    3. Anxiety    4. Essential hypertension    5. Abnormal EKG         Plan:       Abnormality of ascending  aorta  -     CT Chest Abdomen Without Contrast (XPD); Future; Expected date: 02/07/2025    Substernal chest pain  -     CT Chest Abdomen Without Contrast (XPD); Future; Expected date: 02/07/2025    Anxiety  -     ALPRAZolam (XANAX) 0.5 MG tablet; Take 1 tablet (0.5 mg total) by mouth 2 (two) times daily as needed for Anxiety.  Dispense: 20 tablet; Refill: 0    Essential hypertension    Abnormal EKG      Assessment & Plan    IMPRESSION:  - Reassessed cardiac symptoms, noting improvement in palpitations but persistent sensation in upper abdominal area  - Considered esophageal spasms or acid reflux as potential causes for symptoms  - Evaluated need for increased dose of long-acting metoprolol for better symptom control  - Concerned about potential aneurysm or other vascular issues given new symptom presentation  - Determined need for CT scan to assess for hiatal hernia, evaluate aorta and abdominal organs, and rule out serious vascular conditions  - Emphasized importance of blood pressure control to prevent hypertensive cardiomyopathy and vascular complications  - Planned to review lipid panel results before making definitive recommendation on statin therapy    PALPITATIONS:  - Monitor the patient's ongoing palpitations, noting improvement.  - Symptoms are worse when lying down and can affect sleep.  - Note the patient's report of feeling heartbeat or pulsing sensation in upper abdominal area.  - Performed physical exam and found rate and rhythm to be good, with no audible pulsing.  - Plan to increase the dose of long-acting metoprolol to better control symptoms, pending imaging results.  - Noted the patient's report of a pulsing sensation in the femoral artery area for 2 days, which has since resolved.    POTENTIAL ESOPHAGEAL ISSUES:  - Consider the possibility of esophageal spasms or acid reflux as potential causes for the symptoms.  - Recommend trying omeprazole or similar proton pump inhibitor to see if it helps  with symptoms.  - Consider starting OTC omeprazole for potential acid reflux.  - Note that the patient reports previous unsuccessful treatment with acid reflux medication.  - Consider the possibility of acid reflux or esophageal issues as potential causes for symptoms.  - Order CT of chest and abdomen to evaluate for hiatal hernia.      CARDIAC EVALUATION:  - Refer the patient to a cardiologist for further evaluation.    HYPERTENSION:  - Explain potential consequences of uncontrolled hypertension  - Advise the patient to continue blood pressure management.  - Emphasize the importance of controlling blood pressure to prevent complications like aneurysms and cardiac hypertrophy.      HYPERLIPIDEMIA:  - Educate on the role of statins in preventing lipid buildup and calcification in blood vessels throughout the body.  - Note that the patient reports restarting atorvastatin recently.  - Await lipid panel results.  - Explain the role of statins in preventing lipid buildup and blockages in blood vessels.  - Recommend continuing statin medication when cholesterol is elevated for prevention, but allow the patient to wait for lipid panel results before deciding.    ANXIETY:  - Note that the patient reports feeling anxious about bodily symptoms.    - Prescribe alprazolam to be taken 30 minutes before CT.  - Prescribe alprazolam to help with potential anxiety during CT and possibly aid with sleep.    ELEVATED TROPONIN:  - Discuss potential causes of elevated troponin levels, including mild cardiac and pulmonary issues.      CARDIOLOGY REFERRAL:  - Referred to Dr. Burgos in cardiology department for further evaluation.    FOLLOW UP:  - Contact cardiology office to schedule appointment with Dr. Burgos or first available cardiologist.  - Follow up after CT scan results are reviewed.         Follow up if symptoms worsen or fail to improve.        This note was generated with the assistance of ambient listening technology. Verbal  consent was obtained by the patient and accompanying visitor(s) for the recording of patient appointment to facilitate this note. I attest to having reviewed and edited the generated note for accuracy, though some syntax or spelling errors may persist. Please contact the author of this note for any clarification.      2/21/2025 Karishma Dee

## 2025-02-12 NOTE — TELEPHONE ENCOUNTER
Cardiac procedures, labs ordered by Ani Schwarz OV note and OV note before ER visit sent to Dr. Santiago's office @fax #: [phone].

## 2025-02-14 ENCOUNTER — TELEPHONE (OUTPATIENT)
Dept: FAMILY MEDICINE | Facility: CLINIC | Age: 55
End: 2025-02-14
Payer: COMMERCIAL

## 2025-02-14 NOTE — TELEPHONE ENCOUNTER
"----- Message from Karishma Dee APRN-CNP sent at 2/14/2025 10:42 AM CST -----  Please let patient know, unfortunately the lipids in his blood are the type that are the highest risk for coronary artery disease, which increases his risks for events like heart attack and stroke. I do want to put him on cholesterol lowering medication. I know he has tried the atorvastatin. If he really thinks he had side effects from that medication, there is a very effective, non "statin" medication, that I highly recommend, and has no side effects similar to statins. The two differences is, it is an injection, taken on once every 2 weeks, and it is hard to get covered by insurance. But I will try if this is the route he wants to go.   "

## 2025-02-17 ENCOUNTER — RESULTS FOLLOW-UP (OUTPATIENT)
Dept: FAMILY MEDICINE | Facility: CLINIC | Age: 55
End: 2025-02-17
Payer: COMMERCIAL

## 2025-02-17 NOTE — TELEPHONE ENCOUNTER
"----- Message from AMOR Magana sent at 2/17/2025  5:17 PM CST -----  There are non statin pills. They will not touch his levels. They are meant to be taken to intensify the work of the statin, not alone for management. Maybe cardiology will have more options.   ----- Message -----  From: Savanna Boo LPN  Sent: 2/17/2025  10:54 AM CST  To: AMOR Magana    ----- Message from Savanna Boo LPN sent at 2/17/2025 10:54 AM CST -----      ----- Message -----  From: Karishma Dee APRN-CNP  Sent: 2/14/2025  10:42 AM CST  To: [facility] Staff    Please let patient know, unfortunately the lipids in his blood are the type that are the highest risk for coronary artery disease, which increases his risks for events like heart attack and stroke. I do want to put him on cholesterol lowering medication. I know he has tried the atorvastatin. If he really thinks he had side effects from that medication, there is a very effective, non "statin" medication, that I highly recommend, and has no side effects similar to statins. The two differences is, it is an injection, taken on once every 2 weeks, and it is hard to get covered by insurance. But I will try if this is the route he wants to go.   "

## 2025-02-17 NOTE — TELEPHONE ENCOUNTER
Spoke with pt verbatim per Karishma. Pt voiced understanding.  Pt states he restarted his atorvastatin on 2/5/2025 and states that the side effects really are not that bad for him. He would like a non statin but does not want an injection, was wondering if there was a pill form. Pt also would like Karishma to know that he has an appt with Cardio on Wednesday.

## 2025-02-18 ENCOUNTER — TELEPHONE (OUTPATIENT)
Dept: FAMILY MEDICINE | Facility: CLINIC | Age: 55
End: 2025-02-18
Payer: COMMERCIAL

## 2025-02-18 NOTE — TELEPHONE ENCOUNTER
[facility] requesting additional code for Ferritin and Iron labs. Added Z79.899, denied.     This was used:   R002 PALPITATIONS  R000 TACHYCARDIA, UNSPECIFIED   THYTOX DIF GOIT WO CRI

## 2025-02-18 NOTE — TELEPHONE ENCOUNTER
Informed pt verbatim per Karishma. He states he will see his cardiologist tomorrow and let us know what is recommended.

## 2025-03-01 ENCOUNTER — PATIENT MESSAGE (OUTPATIENT)
Dept: FAMILY MEDICINE | Facility: CLINIC | Age: 55
End: 2025-03-01
Payer: COMMERCIAL

## 2025-03-10 ENCOUNTER — PATIENT MESSAGE (OUTPATIENT)
Dept: FAMILY MEDICINE | Facility: CLINIC | Age: 55
End: 2025-03-10
Payer: COMMERCIAL

## 2025-06-11 ENCOUNTER — PATIENT MESSAGE (OUTPATIENT)
Dept: FAMILY MEDICINE | Facility: CLINIC | Age: 55
End: 2025-06-11
Payer: COMMERCIAL

## 2025-06-11 DIAGNOSIS — F41.9 ANXIETY: ICD-10-CM

## 2025-06-12 RX ORDER — ALPRAZOLAM 0.5 MG/1
0.5 TABLET ORAL 2 TIMES DAILY PRN
Qty: 20 TABLET | Refills: 2 | Status: SHIPPED | OUTPATIENT
Start: 2025-06-12

## 2025-06-12 NOTE — TELEPHONE ENCOUNTER
Pt is asking for a refill on his Xanax. Last office visit 02/07/2025. No up coming office visit. Chart shows last dispense 02/07/2025. No UDS on file.

## 2025-07-24 ENCOUNTER — PATIENT MESSAGE (OUTPATIENT)
Dept: FAMILY MEDICINE | Facility: CLINIC | Age: 55
End: 2025-07-24
Payer: COMMERCIAL

## 2025-07-24 DIAGNOSIS — Z12.5 SPECIAL SCREENING FOR MALIGNANT NEOPLASM OF PROSTATE: Primary | ICD-10-CM

## 2025-07-24 DIAGNOSIS — I10 ESSENTIAL HYPERTENSION: ICD-10-CM

## 2025-07-24 DIAGNOSIS — E78.2 MIXED HYPERLIPIDEMIA: ICD-10-CM

## 2025-07-24 DIAGNOSIS — Z79.899 ENCOUNTER FOR LONG-TERM (CURRENT) USE OF MEDICATIONS: ICD-10-CM
